# Patient Record
Sex: FEMALE | Employment: PART TIME | URBAN - METROPOLITAN AREA
[De-identification: names, ages, dates, MRNs, and addresses within clinical notes are randomized per-mention and may not be internally consistent; named-entity substitution may affect disease eponyms.]

---

## 2017-01-12 ENCOUNTER — THERAPY VISIT (OUTPATIENT)
Dept: PHYSICAL THERAPY | Facility: CLINIC | Age: 37
End: 2017-01-12
Payer: COMMERCIAL

## 2017-01-12 DIAGNOSIS — M25.461 EFFUSION OF RIGHT KNEE: ICD-10-CM

## 2017-01-12 DIAGNOSIS — Z98.890 S/P MENISCECTOMY: ICD-10-CM

## 2017-01-12 DIAGNOSIS — M25.561 RIGHT KNEE PAIN: Primary | ICD-10-CM

## 2017-01-12 PROCEDURE — 97530 THERAPEUTIC ACTIVITIES: CPT | Mod: GP | Performed by: PHYSICAL THERAPIST

## 2017-01-12 PROCEDURE — 97112 NEUROMUSCULAR REEDUCATION: CPT | Mod: GP | Performed by: PHYSICAL THERAPIST

## 2017-01-12 PROCEDURE — 97110 THERAPEUTIC EXERCISES: CPT | Mod: GP | Performed by: PHYSICAL THERAPIST

## 2017-01-12 NOTE — PROGRESS NOTES
Subjective:    HPI                    Objective:    System    Physical Exam    General     ROS    Assessment/Plan:      PROGRESS  REPORT    Progress reporting period is from 1/12/17.       SUBJECTIVE  Claribel returns to therapy for her 5th therapy session.  has been working on exercises for about 3 months and some of the kinks have been working out.  Looking for an upgrade of her exercises.    Current pain level is 1/10  .     Changes in function:  Yes (See Goal flowsheet attached for changes in current functional level)  Adverse reaction to treatment or activity: None    OBJECTIVE    ROM = WNL.      Strength:     Hip ABD = 4-/5   Hip ER = 4/5   Hip Ext = 4/5     AMB with reciprocal gait    Able to squat to 45 deg with good mechanics    ASSESSMENT/PLAN  Updated problem list and treatment plan: Diagnosis 1:  S/p Right knee scope partial meniscectomy  Pain -  self management and home program  Decreased strength - therapeutic exercise, therapeutic activities and home program  Impaired muscle performance - neuro re-education  Decreased function - therapeutic activities  STG/LTGs have been met or progress has been made towards goals:  Yes (See Goal flow sheet completed today.)  Assessment of Progress: Patient is meeting short term goals and is progressing towards long term goals.  Self Management Plans:  Patient has been instructed in a home treatment program.  I have re-evaluated this patient and find that the nature, scope, duration and intensity of the therapy is appropriate for the medical condition of the patient.  Claribel continues to require the following intervention to meet STG and LTG's:  PT    Recommendations:  This patient would benefit from continued therapy.     Frequency:  2 X a month, once daily  Duration:  for 2 months        Please refer to the daily flowsheet for treatment today, total treatment time and time spent performing 1:1 timed codes.

## 2017-01-26 ENCOUNTER — THERAPY VISIT (OUTPATIENT)
Dept: PHYSICAL THERAPY | Facility: CLINIC | Age: 37
End: 2017-01-26
Payer: COMMERCIAL

## 2017-01-26 DIAGNOSIS — M25.461 EFFUSION OF RIGHT KNEE: ICD-10-CM

## 2017-01-26 DIAGNOSIS — M25.561 RIGHT KNEE PAIN: Primary | ICD-10-CM

## 2017-01-26 DIAGNOSIS — Z98.890 S/P MENISCECTOMY: ICD-10-CM

## 2017-01-26 PROCEDURE — 97112 NEUROMUSCULAR REEDUCATION: CPT | Mod: GP | Performed by: PHYSICAL THERAPIST

## 2017-01-26 PROCEDURE — 97530 THERAPEUTIC ACTIVITIES: CPT | Mod: GP | Performed by: PHYSICAL THERAPIST

## 2017-01-26 PROCEDURE — 97110 THERAPEUTIC EXERCISES: CPT | Mod: GP | Performed by: PHYSICAL THERAPIST

## 2017-02-09 ENCOUNTER — THERAPY VISIT (OUTPATIENT)
Dept: PHYSICAL THERAPY | Facility: CLINIC | Age: 37
End: 2017-02-09
Payer: COMMERCIAL

## 2017-02-09 DIAGNOSIS — M25.561 RIGHT KNEE PAIN: Primary | ICD-10-CM

## 2017-02-09 DIAGNOSIS — Z98.890 S/P MENISCECTOMY: ICD-10-CM

## 2017-02-09 DIAGNOSIS — M25.461 EFFUSION OF RIGHT KNEE: ICD-10-CM

## 2017-02-09 PROCEDURE — 97110 THERAPEUTIC EXERCISES: CPT | Mod: GP | Performed by: PHYSICAL THERAPIST

## 2017-02-09 PROCEDURE — 97530 THERAPEUTIC ACTIVITIES: CPT | Mod: GP | Performed by: PHYSICAL THERAPIST

## 2017-02-09 PROCEDURE — 97112 NEUROMUSCULAR REEDUCATION: CPT | Mod: GP | Performed by: PHYSICAL THERAPIST

## 2017-02-23 ENCOUNTER — THERAPY VISIT (OUTPATIENT)
Dept: PHYSICAL THERAPY | Facility: CLINIC | Age: 37
End: 2017-02-23
Payer: COMMERCIAL

## 2017-02-23 DIAGNOSIS — M25.561 RIGHT KNEE PAIN: ICD-10-CM

## 2017-02-23 DIAGNOSIS — Z98.890 S/P MENISCECTOMY: ICD-10-CM

## 2017-02-23 DIAGNOSIS — M25.461 EFFUSION OF RIGHT KNEE: ICD-10-CM

## 2017-02-23 PROCEDURE — 97112 NEUROMUSCULAR REEDUCATION: CPT | Mod: GP | Performed by: PHYSICAL THERAPIST

## 2017-02-23 PROCEDURE — 97110 THERAPEUTIC EXERCISES: CPT | Mod: GP | Performed by: PHYSICAL THERAPIST

## 2017-04-13 ENCOUNTER — OFFICE VISIT (OUTPATIENT)
Dept: ORTHOPEDICS | Facility: CLINIC | Age: 37
End: 2017-04-13
Payer: COMMERCIAL

## 2017-04-13 VITALS
HEIGHT: 71 IN | SYSTOLIC BLOOD PRESSURE: 124 MMHG | BODY MASS INDEX: 18.9 KG/M2 | WEIGHT: 135 LBS | DIASTOLIC BLOOD PRESSURE: 78 MMHG

## 2017-04-13 DIAGNOSIS — Z98.890 S/P MENISCECTOMY: ICD-10-CM

## 2017-04-13 DIAGNOSIS — M25.562 ARTHRALGIA OF BOTH LOWER LEGS: Primary | ICD-10-CM

## 2017-04-13 DIAGNOSIS — M25.561 ARTHRALGIA OF BOTH LOWER LEGS: Primary | ICD-10-CM

## 2017-04-13 PROCEDURE — 99214 OFFICE O/P EST MOD 30 MIN: CPT | Performed by: FAMILY MEDICINE

## 2017-04-13 NOTE — MR AVS SNAPSHOT
"              After Visit Summary   4/13/2017    Claribel Das    MRN: [id number]           Patient Information     Date Of Birth          1980        Visit Information        Provider Department      4/13/2017 9:00 AM Bo Trinh MD [facility]        Today's Diagnoses     Arthralgia of both lower legs    -  1    S/P meniscectomy           Follow-ups after your visit        Follow-up notes from your care team     Return for MRI results/further treatment plan as necessary.      Future tests that were ordered for you today     Open Future Orders        Priority Expected Expires Ordered    MR Knee Left w/o Contrast Routine  4/13/2018 4/13/2017    MR Knee Right w/o Contrast Routine  4/13/2018 4/13/2017            Who to contact     If you have questions or need follow up information about today's clinic visit or your schedule please contact [facility] directly at [phone].  Normal or non-critical lab and imaging results will be communicated to you by [employer], letter or phone within 4 business days after the clinic has received the results. If you do not hear from us within 7 days, please contact the clinic through [employer] or phone. If you have a critical or abnormal lab result, we will notify you by phone as soon as possible.  Submit refill requests through Celeno or call your pharmacy and they will forward the refill request to us. Please allow 3 business days for your refill to be completed.          Additional Information About Your Visit        [employer] Information     Celeno lets you send messages to your doctor, view your test results, renew your prescriptions, schedule appointments and more. To sign up, go to [email or web address] . Click on \"Log in\" on the left side of the screen, which will take you to the Welcome page. Then click on \"Sign up Now\" on the right side of the page.     You " "will be asked to enter the access code listed below, as well as some personal information. Please follow the directions to create your username and password.     Your access code is: [email or web address]  Expires: 2017  2:48 PM     Your access code will  in 90 days. If you need help or a new code, please call your [place] clinic or [phone].        Care EveryWhere ID     This is your Care EveryWhere ID. This could be used by other organizations to access your [place] medical records  [id number]        Your Vitals Were     Height BMI (Body Mass Index)                5' 10.5\" (1.791 m) 19.1 kg/m2           Blood Pressure from Last 3 Encounters:   17 124/78   16 131/80   16 131/80    Weight from Last 3 Encounters:   17 135 lb (61.2 kg)   16 132 lb (59.9 kg)   16 132 lb (59.9 kg)               Primary Care Provider    None Specified       No primary provider on file.        Thank you!     Thank you for choosing [facility]  for your care. Our goal is always to provide you with excellent care. Hearing back from our patients is one way we can continue to improve our services. Please take a few minutes to complete the written survey that you may receive in the mail after your visit with us. Thank you!             Your Updated Medication List - Protect others around you: Learn how to safely use, store and throw away your medicines at www.disposemymeds.org.          This list is accurate as of: 17  9:51 AM.  Always use your most recent med list.                   Brand Name Dispense Instructions for use    NAPROXEN PO          NEW MED            "

## 2017-04-13 NOTE — PROGRESS NOTES
ROB[facility]    [facility]   Follow-up Visit s Apr 13, 2017    PCP: No primary care provider on file.      Subjective:  Claribel is a 36 year old female who is seen in follow up for evaluation of   Chief Complaint   Patient presents with     RECHECK     f/u R knee pain     Her last visit was on 3/14/16. She was referred to Dr. Tuan Lopez and had a right medial menisectomy in July 2016 and her pain hasn't fully resolved. 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 A couple days after that episode started to have posterior/lateral joint line pain and pain over the fibular head. She hasn't had any recent imaging since this episode. She does still having clicking in the medial and lateral knee that is uncomfortable. She has completed extensive physical therapy and feels stronger but the pain didn't resolve. She is following up today for further evaluation.    Also notes posteromedial LEFT knee pain and clicking, has been present on and off for several months, addressed with surgeon last fall and he opted to watch it then but she is still having trouble. Has been working on this in PHYSICAL THERAPY also    Patient's past medical, surgical, social and family histories are reviewed today.      Patient Active Problem List    Diagnosis Date Noted     Right knee pain 09/15/2016     Priority: Medium     S/P meniscectomy 09/15/2016     Priority: Medium     Effusion of right knee 09/15/2016     Priority: Medium     Low back pain 12/04/2014     Priority: Medium     Diagnosis updated by automated process. Provider to review and confirm.       Lumbar radiculopathy 12/04/2014     Priority: Medium       Family History   Problem Relation Age of Onset     CEREBROVASCULAR DISEASE Maternal Grandmother      CEREBROVASCULAR DISEASE Maternal Grandfather      CEREBROVASCULAR DISEASE Paternal Grandmother      Alcohol/Drug Brother       "OSTEOPOROSIS Maternal Grandmother      Psychotic Disorder Brother        History     Social History     Marital Status:      Spouse Name: N/A     Number of Children: N/A     Years of Education: N/A     Social History Main Topics     Smoking status: Never Smoker      Smokeless tobacco: None     Alcohol Use: None     Drug Use: None     Sexually Active: None     Past Surgical History:   Procedure Laterality Date     BACK SURGERY      lumbar microdiscectomy      SECTION  2010     HC KNEE SCOPE,SINGLE MENISECTOMY Right 1997       Review of Systems   Musculoskeletal: Positive for joint pain.   All other systems reviewed and are negative.        Physical Exam   Musculoskeletal:        Right knee: Lateral joint line tenderness noted.        Left knee: Medial joint line tenderness noted.     /78 (BP Location: Left arm, Patient Position: Chair, Cuff Size: Adult Regular)  Ht 5' 10.5\" (1.791 m)  Wt 135 lb (61.2 kg)  BMI 19.1 kg/m2  Constitutional:well-developed, well-nourished, and in no distress.   Cardiovascular: Intact distal pulses.    Neurological: alert. Gait Normal:   Gait, station, stance, and balance appear normal for age  Skin: Skin is warm and dry.   Psychiatric: Mood and affect normal.   Respiratory: unlabored, speaks in full sentences  Lymph: no LAD, no lymphangitis      Left Knee Exam   Swelling: None  Effusion: No    Tenderness   The patient is experiencing tenderness in the medial joint line.    Range of Motion   Extension: Normal  Flexion:     Abnormal    Tests   McMurrays:  Medial - Negative      Lateral - Negative  Lachman:  Anterior - Negative    Posterior - Negative  Drawer:       Anterior - Negative     Posterior - Negative  Varus:  Negative  Valgus: Negative  Pivot Shift: Negative  Patellar Apprehension: No    Comments:  Pain with compression in full flexoin    Right Knee Exam   Swelling: Mild  Effusion: Yes    Tenderness   The patient is experiencing tenderness in the lateral " joint line.    Range of Motion   Extension: Normal  Flexion:     Abnormal    Tests   McMurrays:  Medial - Negative      Lateral - Negative  Lachman:  Anterior - Negative    Posterior - Negative  Drawer:       Anterior - Negative    Posterior - Negative  Varus:  Negative  Valgus: Negative  Pivot Shift: Negative  Patellar Apprehension: Yes    Comments:  Pain with full flexion            ICD-10-CM    1. Arthralgia of both lower legs M25.561 MR Knee Left w/o Contrast    M25.562 MR Knee Right w/o Contrast   2. S/P meniscectomy Z98.890 MR Knee Right w/o Contrast     Recurrent pain s/p knee arthroscopy last year, but now posterolateral, but with clicking and swelling, concerning for new mensicus tear. MRI offered, given incomplete relief with PHYSICAL THERAPY over last several months, pt eager to proceed.    LEFT knee also troublesome for several months, also despite PHYSICAL THERAPY, with clicking but no swelling, feels similar to RIGHT knee before surgery.     MRI ordered, Claribel instructed to return at least 2 days following MRI to review results and determine treatment plan

## 2017-04-17 ENCOUNTER — TRANSFERRED RECORDS (OUTPATIENT)
Dept: HEALTH INFORMATION MANAGEMENT | Facility: CLINIC | Age: 37
End: 2017-04-17

## 2017-04-20 ENCOUNTER — OFFICE VISIT (OUTPATIENT)
Dept: ORTHOPEDICS | Facility: CLINIC | Age: 37
End: 2017-04-20
Payer: COMMERCIAL

## 2017-04-20 VITALS
HEIGHT: 71 IN | BODY MASS INDEX: 18.9 KG/M2 | SYSTOLIC BLOOD PRESSURE: 125 MMHG | DIASTOLIC BLOOD PRESSURE: 82 MMHG | WEIGHT: 135 LBS

## 2017-04-20 DIAGNOSIS — M25.461 EFFUSION OF RIGHT KNEE: Primary | ICD-10-CM

## 2017-04-20 DIAGNOSIS — M17.11 PRIMARY LOCALIZED OSTEOARTHROSIS, LOWER LEG, RIGHT: ICD-10-CM

## 2017-04-20 DIAGNOSIS — S83.242A TEAR OF MEDIAL MENISCUS OF LEFT KNEE, CURRENT, UNSPECIFIED TEAR TYPE, INITIAL ENCOUNTER: ICD-10-CM

## 2017-04-20 PROCEDURE — 20610 DRAIN/INJ JOINT/BURSA W/O US: CPT | Mod: RT | Performed by: FAMILY MEDICINE

## 2017-04-20 PROCEDURE — 99213 OFFICE O/P EST LOW 20 MIN: CPT | Mod: 25 | Performed by: FAMILY MEDICINE

## 2017-04-20 RX ORDER — METHYLPREDNISOLONE ACETATE 40 MG/ML
40 INJECTION, SUSPENSION INTRA-ARTICULAR; INTRALESIONAL; INTRAMUSCULAR; SOFT TISSUE ONCE
Qty: 1 ML | Refills: 0 | OUTPATIENT
Start: 2017-04-20 | End: 2017-04-20

## 2017-04-20 RX ORDER — LIDOCAINE HYDROCHLORIDE 10 MG/ML
4 INJECTION, SOLUTION INFILTRATION; PERINEURAL ONCE
Qty: 4 ML | Refills: 0 | OUTPATIENT
Start: 2017-04-20 | End: 2017-04-20

## 2017-04-20 NOTE — PATIENT INSTRUCTIONS
Assessment:  1. Effusion of right knee    2. Tear of medial meniscus of left knee, current, unspecified tear type, initial encounter        Plan:  Referral to an Orthopedic Surgeon  Steroid injection of the right knee was performed today in clinic  Icing for the next 1-2 days may be helpful for pain. Injection may take 10-14 days to see the full effect.  Follow up: PRN

## 2017-04-20 NOTE — MR AVS SNAPSHOT
After Visit Summary   4/20/2017    Claribel Das    MRN: [id number]           Patient Information     Date Of Birth          1980        Visit Information        Provider Department      4/20/2017 11:20 AM Bo Trinh MD [facility]        Today's Diagnoses     Effusion of right knee    -  1    Tear of medial meniscus of left knee, current, unspecified tear type, initial encounter        Primary localized osteoarthrosis, lower leg, right          Care Instructions    Assessment:  1. Effusion of right knee    2. Tear of medial meniscus of left knee, current, unspecified tear type, initial encounter        Plan:  Referral to an Orthopedic Surgeon  Steroid injection of the right knee was performed today in clinic  Icing for the next 1-2 days may be helpful for pain. Injection may take 10-14 days to see the full effect.  Follow up: PRN          Follow-ups after your visit        Additional Services     ORTHO  REFERRAL       [facility] is referring you to the Orthopedic  Services at [facility].       The  Representative will assist you in the coordination of your Orthopedic and Musculoskeletal Care as prescribed by your physician.    The  Representative will call you within 24 hours to help schedule your appointment, or you may contact the  Representative at:    [facility] ~ [phone]  [facility] ~ [phone]  [facility] ~ [phone]    Type of Referral : Surgical / Specialist- Tuan Lopez MD [facility]      Timeframe requested: Routine     Coverage of these services is subject to the terms and limitations of your health insurance plan.  Please call member services at your health plan with any benefit or coverage questions.      If X-rays, CT or MRI's have been performed, please contact the facility where  "they were done to arrange for , prior to your scheduled appointment.  Please bring this referral request to your appointment and present it to your specialist.                  Who to contact     If you have questions or need follow up information about today's clinic visit or your schedule please contact [facility] directly at [phone].  Normal or non-critical lab and imaging results will be communicated to you by MyChart, letter or phone within 4 business days after the clinic has received the results. If you do not hear from us within 7 days, please contact the clinic through [employer] or phone. If you have a critical or abnormal lab result, we will notify you by phone as soon as possible.  Submit refill requests through [employer] or call your pharmacy and they will forward the refill request to us. Please allow 3 business days for your refill to be completed.          Additional Information About Your Visit        See[employer]ra Information     [employer] lets you send messages to your doctor, view your test results, renew your prescriptions, schedule appointments and more. To sign up, go to [email or web address] . Click on \"Log in\" on the left side of the screen, which will take you to the Welcome page. Then click on \"Sign up Now\" on the right side of the page.     You will be asked to enter the access code listed below, as well as some personal information. Please follow the directions to create your username and password.     Your access code is: [email or web address]  Expires: 2017  2:48 PM     Your access code will  in 90 days. If you need help or a new code, please call your [place] clinic or [phone].        Care EveryWhere ID     This is your Care EveryWhere ID. This could be used by other organizations to access your [place] medical records  [id number]        Your Vitals Were     Height BMI (Body Mass Index)                5' 10.5\" (1.791 m) 19.1 " kg/m2           Blood Pressure from Last 3 Encounters:   04/20/17 125/82   04/13/17 124/78   03/14/16 131/80    Weight from Last 3 Encounters:   04/20/17 135 lb (61.2 kg)   04/13/17 135 lb (61.2 kg)   03/14/16 132 lb (59.9 kg)              We Performed the Following     DRAIN/INJECT LARGE JOINT/BURSA     METHYLPREDNISOLONE 40 MG INJ     ORTHO  REFERRAL          Today's Medication Changes          These changes are accurate as of: 4/20/17  1:53 PM.  If you have any questions, ask your nurse or doctor.               Start taking these medicines.        Dose/Directions    lidocaine 1 % injection   Used for:  Effusion of right knee   Started by:  Bo Trinh MD        Dose:  4 mL   4 mLs by INTRA-ARTICULAR route once for 1 dose   Quantity:  4 mL   Refills:  0       methylPREDNISolone acetate 40 MG/ML injection   Commonly known as:  DEPO-MEDROL   Used for:  Effusion of right knee   Started by:  Bo Trinh MD        Dose:  40 mg   1 mL (40 mg) by INTRA-ARTICULAR route once for 1 dose   Quantity:  1 mL   Refills:  0            Where to get your medicines      Some of these will need a paper prescription and others can be bought over the counter.  Ask your nurse if you have questions.     You don't need a prescription for these medications     lidocaine 1 % injection    methylPREDNISolone acetate 40 MG/ML injection                Primary Care Provider    None Specified       No primary provider on file.        Thank you!     Thank you for choosing [facility]  for your care. Our goal is always to provide you with excellent care. Hearing back from our patients is one way we can continue to improve our services. Please take a few minutes to complete the written survey that you may receive in the mail after your visit with us. Thank you!             Your Updated Medication List - Protect others around you: Learn how to safely use, store and throw  away your medicines at www.disposemymeds.org.          This list is accurate as of: 4/20/17  1:53 PM.  Always use your most recent med list.                   Brand Name Dispense Instructions for use    lidocaine 1 % injection     4 mL    4 mLs by INTRA-ARTICULAR route once for 1 dose       methylPREDNISolone acetate 40 MG/ML injection    DEPO-MEDROL    1 mL    1 mL (40 mg) by INTRA-ARTICULAR route once for 1 dose       NAPROXEN PO          [facility] MED

## 2017-04-20 NOTE — PROGRESS NOTES
ROBERT    [facility]   Follow-up Visit s Apr 20, 2017    PCP: No primary care provider on file.      Subjective:  Claribel is a 36 year old female who is seen in follow up for evaluation of   Chief Complaint   Patient presents with     RECHECK     B knee MRI results     Her last visit was on 4/13/2017.  Since that time, symptoms have been mostly unchanged. She did notice some swelling in the right knee which is new. Pain today is superior/lateral on the right and posterior/medial on the left. She is returning to discuss MRI results and treatment options    Patient's past medical, surgical, social and family histories are reviewed today.      History from previous visit on 4/13/2017    She was referred to Dr. Tuan Lopez and had a right medial menisectomy in July 2016 and her pain hasn't fully resolved. 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 A couple days after that episode started to have posterior/lateral joint line pain and pain over the fibular head. She hasn't had any recent imaging since this episode. She does still having clicking in the medial and lateral knee that is uncomfortable. She has completed extensive physical therapy and feels stronger but the pain didn't resolve. She is following up today for further evaluation.    Also notes posteromedial LEFT knee pain and clicking, has been present on and off for several months, addressed with surgeon last fall and he opted to watch it then but she is still having trouble. Has been working on this in PHYSICAL THERAPY also    Patient's past medical, surgical, social and family histories are reviewed today.      Patient Active Problem List    Diagnosis Date Noted     Right knee pain 09/15/2016     Priority: Medium     S/P meniscectomy 09/15/2016     Priority: Medium     Effusion of right knee 09/15/2016     Priority: Medium     Low back pain  2014     Priority: Medium     Diagnosis updated by automated process. Provider to review and confirm.       Lumbar radiculopathy 2014     Priority: Medium       Family History   Problem Relation Age of Onset     CEREBROVASCULAR DISEASE Maternal Grandmother      CEREBROVASCULAR DISEASE Maternal Grandfather      CEREBROVASCULAR DISEASE Paternal Grandmother      Alcohol/Drug Brother      OSTEOPOROSIS Maternal Grandmother      Psychotic Disorder Brother        History     Social History     Marital Status:      Spouse Name: N/A     Number of Children: N/A     Years of Education: N/A     Social History Main Topics     Smoking status: Never Smoker      Smokeless tobacco: None     Alcohol Use: None     Drug Use: None     Sexually Active: None     Past Surgical History:   Procedure Laterality Date     BACK SURGERY      lumbar microdiscectomy      SECTION  2010     HC KNEE SCOPE,SINGLE MENISECTOMY Right 1997       Review of Systems   Musculoskeletal: Positive for joint pain.   All other systems reviewed and are negative.        Physical Exam   Musculoskeletal:        Right knee: Lateral joint line tenderness noted.        Left knee: Medial joint line tenderness noted.     There were no vitals taken for this visit.  Constitutional:well-developed, well-nourished, and in no distress.   Cardiovascular: Intact distal pulses.    Neurological: alert. Gait Normal:   Gait, station, stance, and balance appear normal for age  Skin: Skin is warm and dry.   Psychiatric: Mood and affect normal.   Respiratory: unlabored, speaks in full sentences  Lymph: no LAD, no lymphangitis      Left Knee Exam   Swelling: None  Effusion: No    Tenderness   The patient is experiencing tenderness in the medial joint line.    Range of Motion   Extension: Normal  Flexion:     Abnormal    Tests   McMurrays:  Medial - Negative      Lateral - Negative  Lachman:  Anterior - Negative    Posterior - Negative  Drawer:       Anterior  - Negative     Posterior - Negative  Varus:  Negative  Valgus: Negative  Pivot Shift: Negative  Patellar Apprehension: No    Comments:  Pain with compression in full flexoin    Right Knee Exam   Swelling: Mild  Effusion: Yes    Tenderness   The patient is experiencing tenderness in the lateral joint line.    Range of Motion   Extension: Normal  Flexion:     Abnormal    Tests   McMurrays:  Medial - Negative      Lateral - Negative  Lachman:  Anterior - Negative    Posterior - Negative  Drawer:       Anterior - Negative    Posterior - Negative  Varus:  Negative  Valgus: Negative  Pivot Shift: Negative  Patellar Apprehension: Yes    Comments:  Pain with full flexion          EXAM: MRI EXAMINATION OF THE LEFT KNEE  CLINICAL INFORMATION: Left knee pain for approximately 9 months. Pain posteromedially.  TECHNICAL INFORMATION: MRI of the knee was performed without contrast. Proton density weighted images were obtained in all 3 planes. Additional T2 weighted coronal images were obtained.  INTERPRETATION: No prior for comparison.  No significant knee joint effusion. No loose body. Trace fluid in the popliteal bursa. There is mild intramuscular edema involving the lateral head of the gastrocnemius. Lesser involvement of the plantaris. Findings likely represent a low-grade strain. No hematoma.  Bones: No fracture or stress fracture is identified. No osteonecrosis.  Ligaments and tendons: The medial collateral ligament remains intact. No tear of the cruciate ligaments is identified. The iliotibial band remains intact.  Posterolateral corner: The fibular collateral ligament and remaining posterolateral corner structures are intact.  Extensor Mechanism: The patellar and quadriceps tendons are intact. The medial and lateral patellofemoral ligaments are intact.  Medial Compartment: The articular cartilage is intact, without focal defect or subchondral edema. There is a horizontal tear identified along the undersurface of the  posterior horn-body junction of the medial meniscus peripherally. The tear extends throughout the remaining posterior horn, though closer to the free edge. No displaced fragment is identified. There is partial fraying extending into the posterior root attachment.  Lateral Compartment: The articular cartilage is intact, without focal defect or subchondral edema. The lateral meniscus remains intact.  Patellofemoral articulation: The articular cartilage is intact, without focal defect or subchondral edema.  CONCLUSION:  1. Horizontal tear identified along the undersurface of the junction of the body and posterior horn of the left knee medial meniscus, with the tear extending throughout the remaining posterior horn closer to the free edge. No displacement. Partial fraying of the posterior root attachment.  2. No discrete osteochondral lesion identified in any compartment.  3. No acute ligament injury.  4. Mild intramuscular edema and likely low-grade strain of the lateral head of the gastrocnemius. No tendon tear or hematoma.  SRE    Electronically signed on 4/18/2017 10:32:00 AM by Manuela Cerna M.D.       EXAM: MRI EXAMINATION OF THE RIGHT KNEE  CLINICAL INFORMATION: Bilateral knee pain. Pain in the right knee for approximately 5 months. Partial medial meniscectomy 7/28/2016. Prior partial meniscectomy in 1997.  TECHNICAL INFORMATION: MRI of the knee was performed without contrast. Proton density weighted images were obtained in all 3 planes. Additional T2 weighted coronal images were obtained.  INTERPRETATION: Comparison is to a prior study dated 3/10/2016.  There is diffuse soft tissue edema identified about the knee. Small knee joint effusion. Mild prepatellar and infrapatellar soft tissue edema. There is intramuscular edema involving the distal biceps femoris muscle belly. Similar involvement of the lateral head of the gastrocnemius. Findings likely related to recent low grade strain. No discrete tendon tear or  hematoma. Trace fluid in the popliteal bursa.  Bones: There is very subtle marrow edema identified adjacent to the lateral femoral epicondyle, nonspecific. This may represent subtle reactive change. This is unchanged in the interval. No fracture or stress fracture is identified. No sign of osteonecrosis.  Ligaments and tendons: Likely prior, healed sprain of the medial collateral ligament proximally. No acute injury. No tear of the cruciate ligaments is identified. The iliotibial band remains intact.  Posterolateral corner: The fibular collateral ligament and remaining posterolateral corner structures are intact.  Extensor Mechanism: The patellar and quadriceps tendons are intact. The medial and lateral patellofemoral ligaments are intact.  Medial Compartment: The articular cartilage is intact, without focal defect or subchondral edema. There are postoperative changes of a prior partial medial meniscectomy. Diminutive size of the remaining body and posterior horn is noted. Portions have been resected in the interval. There is truncation along the free edge of the posterior horn, favored to be postoperative. There is concern for subtle horizontal, undersurface fraying of the posterior horn as seen on series 4 image 7-9. No displaced fragment is appreciated.  Lateral Compartment: Mild chondromalacia along the posterior articular surface of the lateral tibial plateau. No discrete tear of the lateral meniscus.  Patellofemoral articulation: Trochlear articular cartilage remains intact. No chondral defect of the patella.  CONCLUSION:  1. Postoperative changes of a prior partial right knee medial meniscectomy, with a diminutive size of the remaining body and posterior horn. Portions of meniscal tissue have been resected in the interval. Truncation along the free edge of the posterior horn is favored to be postoperative. There is concern for subtle, horizontal undersurface fraying of the posterior horn near the junction  with the remaining posterior body. No displaced fragment.  2. Mild chondromalacia along the posterior articular surface of the lateral tibial plateau.  3. Small knee joint effusion.  4. Likely strain of the distal biceps femoris muscle belly, and of the lateral head of the gastrocnemius. No tendon tear or hematoma identified.  5. Subtle marrow edema identified adjacent to the lateral femoral epicondyle, nonspecific, but possibly representing subtle reactive change. This is unchanged in the interval. No fracture or stress fracture.  SRE      Electronically signed on 4/18/2017 10:51:00 AM by Manuela Cerna M.D.       ICD-10-CM    1. Effusion of right knee M25.461 DRAIN/INJECT LARGE JOINT/BURSA     METHYLPREDNISOLONE 40 MG INJ     methylPREDNISolone acetate (DEPO-MEDROL) 40 MG/ML injection     lidocaine 1 % injection   2. Tear of medial meniscus of left knee, current, unspecified tear type, initial encounter S83.242A ORTHO  REFERRAL   3. Primary localized osteoarthrosis, lower leg, right M17.11      Now with LEFT knee meniscus tear, discussed options, she will return to dr glass for further eval and possible surgery.      RIGHT lateral knee symptoms may be related to fem condyle bony edema, lat tib chondromalacia and effusion. A short term acute pain strategy offered with cortisone steroid injection , pt eager to proceed. Longer discussion ensued regarding possible RLE longer leg, suggested trial of arch supports to unload lateral compartment .    The benefits, risks, indications for and alternatives to the procedure were discussed with the patient, including bleeding, infection, hyperglycemia, localized lipodystrophy and hypopigmentation. She elected to proceed, and informed consent was signed.    PROCEDURE:  JOINT INJECTION.         After a discussion of risks, benefits and side effects of procedure, informed patient consent was obtained, and form signed by patient and physician.       The right  knee  was prepped with Chloroprep.    INJECTION:  Using 4 cc of 1% lidocaine mixed                           with 40 mg of DepoMedrol, the right knee joint was successfully injected                           without complication using a 22G needle with the patient lying supine and the injection administered using the superolateral or lateral patellar approach.  She tolerated the procedure well with minimal discomfort. Bandaid applied. Instructed to monitor for increased warmth, redness, pain or swelling which might indicate an infection.

## 2017-04-20 NOTE — Clinical Note
Here is an update on your patient that was seen at [facility] in [place].   Please let us know if you have any questions.

## 2017-05-25 ENCOUNTER — THERAPY VISIT (OUTPATIENT)
Dept: PHYSICAL THERAPY | Facility: CLINIC | Age: 37
End: 2017-05-25
Payer: COMMERCIAL

## 2017-05-25 DIAGNOSIS — M25.561 RIGHT KNEE PAIN: ICD-10-CM

## 2017-05-25 DIAGNOSIS — M25.461 EFFUSION OF RIGHT KNEE: ICD-10-CM

## 2017-05-25 DIAGNOSIS — Z98.890 S/P MENISCECTOMY: ICD-10-CM

## 2017-05-25 PROCEDURE — 97112 NEUROMUSCULAR REEDUCATION: CPT | Mod: GP | Performed by: PHYSICAL THERAPIST

## 2017-05-25 PROCEDURE — 97110 THERAPEUTIC EXERCISES: CPT | Mod: GP | Performed by: PHYSICAL THERAPIST

## 2017-05-25 NOTE — MR AVS SNAPSHOT
"              After Visit Summary   5/25/2017    Claribel Das    MRN: [id number]           Patient Information     Date Of Birth          1980        Visit Information        Provider Department      5/25/2017 9:20 AM Lance Del Toro PT [facility]        Today's Diagnoses     Right knee pain        S/P meniscectomy        Effusion of right knee           Follow-ups after your visit        Your next 10 appointments already scheduled     Alan 15, 2017 10:00 AM CDT   SABRINA Extremity with Lance Del Toro PT   [facility] ([facility] [place])    [address] [place] [address]  [id number]  [place] [address] [phone]              Who to contact     If you have questions or need follow up information about today's clinic visit or your schedule please contact [facility] directly at [phone].  Normal or non-critical lab and imaging results will be communicated to you by [employer], letter or phone within 4 business days after the clinic has received the results. If you do not hear from us within 7 days, please contact the clinic through [employer] or phone. If you have a critical or abnormal lab result, we will notify you by phone as soon as possible.  Submit refill requests through [employer] or call your pharmacy and they will forward the refill request to us. Please allow 3 business days for your refill to be completed.          Additional Information About Your Visit        [employer] Information     [employer] lets you send messages to your doctor, view your test results, renew your prescriptions, schedule appointments and more. To sign up, go to [email or web address] . Click on \"Log in\" on the left side of the screen, which will take you to the Welcome page. Then click on \"Sign up Now\" on the right side of the page.     You will be asked to enter the access code " listed below, as well as some personal information. Please follow the directions to create your username and password.     Your access code is: [email or web address]  Expires: 2017 11:11 AM     Your access code will  in 90 days. If you need help or a new code, please call your [facility] clinic or [phone].        Care EveryWhere ID     This is your Care EveryWhere ID. This could be used by other organizations to access your [facility] medical records  [id number]         Blood Pressure from Last 3 Encounters:   17 125/82   17 124/78   16 131/80    Weight from Last 3 Encounters:   17 61.2 kg (135 lb)   17 61.2 kg (135 lb)   16 59.9 kg (132 lb)              We Performed the Following     NEUROMUSCULAR RE-EDUCATION     THERAPEUTIC EXERCISES        Primary Care Provider    None Specified       No primary provider on file.        Thank you!     Thank you for choosing [facility]  for your care. Our goal is always to provide you with excellent care. Hearing back from our patients is one way we can continue to improve our services. Please take a few minutes to complete the written survey that you may receive in the mail after your visit with us. Thank you!             Your Updated Medication List - Protect others around you: Learn how to safely use, store and throw away your medicines at www.disposemymeds.org.          This list is accurate as of: 17 11:11 AM.  Always use your most recent med list.                   Brand Name Dispense Instructions for use    NAPROXEN PO          NEW MED

## 2017-05-25 NOTE — PROGRESS NOTES
Subjective:    HPI                    Objective:    System    Physical Exam    General     ROS    Assessment/Plan:      PROGRESS  REPORT    Progress reporting period is from 5/25/17       SUBJECTIVE  Olive returns with new orders from Dr. Lopez. Since last therapy session 3 months ago she had an injection into the right knee which did not seem to help.  Then she underwent MRIs for both knees.  Right knee she reports the MRI showed: concern for new meniscal tear but not definitive (Dr. Lopez felt it was post-surgical changes), mild chondromalacia in the lateral compartment, bone edema.  Left knee showed medial meniscal tear.    Current pain level is 5/10  .     Changes in function:  Yes (See Goal flowsheet attached for changes in current functional level)  Adverse reaction to treatment or activity: None    OBJECTIVE  Right knee ROM:  7-0-140.      Left Knee ROM: 7-0-142.      Strength:     Right Hip:  ABD = 3+/5, ER = 4/5, Ext = 4/5.       Left Hip:  ABD = 3+/5, ER = 4/5, Ext = 4/5     ASSESSMENT/PLAN  Updated problem list and treatment plan: Diagnosis 1:  S/p Right knee partial medial meniscectomy, ITBS, PFPS  Pain -  self management, education and home program  Decreased strength - therapeutic exercise and therapeutic activities  Impaired muscle performance - neuro re-education  Decreased function - therapeutic activities  Diagnosis 2:  Left Knee Pain, Medial Meniscal Tear, PFPS   Pain -  self management, education and home program  Decreased strength - therapeutic exercise and therapeutic activities  Impaired muscle performance - neuro re-education  Decreased function - therapeutic activities  STG/LTGs have been met or progress has been made towards goals:  Yes (See Goal flow sheet completed today.)  Assessment of Progress: The patient's condition has potential to improve.  Self Management Plans:  Patient has been instructed in a home treatment program.  I have re-evaluated this patient and find that the  nature, scope, duration and intensity of the therapy is appropriate for the medical condition of the patient.  Claribel continues to require the following intervention to meet STG and LTG's:  PT    Recommendations:  This patient would benefit from continued therapy.     Frequency:  2-4 X a month, once daily  Duration:  for 10 visits        Please refer to the daily flowsheet for treatment today, total treatment time and time spent performing 1:1 timed codes.

## 2017-06-15 ENCOUNTER — THERAPY VISIT (OUTPATIENT)
Dept: PHYSICAL THERAPY | Facility: CLINIC | Age: 37
End: 2017-06-15
Payer: COMMERCIAL

## 2017-06-15 DIAGNOSIS — Z98.890 S/P MENISCECTOMY: ICD-10-CM

## 2017-06-15 DIAGNOSIS — M25.561 RIGHT KNEE PAIN: ICD-10-CM

## 2017-06-15 DIAGNOSIS — M25.461 EFFUSION OF RIGHT KNEE: ICD-10-CM

## 2017-06-15 PROCEDURE — 97112 NEUROMUSCULAR REEDUCATION: CPT | Mod: GP | Performed by: PHYSICAL THERAPIST

## 2017-06-15 PROCEDURE — 97110 THERAPEUTIC EXERCISES: CPT | Mod: GP | Performed by: PHYSICAL THERAPIST

## 2017-06-29 ENCOUNTER — THERAPY VISIT (OUTPATIENT)
Dept: PHYSICAL THERAPY | Facility: CLINIC | Age: 37
End: 2017-06-29
Payer: COMMERCIAL

## 2017-06-29 DIAGNOSIS — M25.461 EFFUSION OF RIGHT KNEE: ICD-10-CM

## 2017-06-29 DIAGNOSIS — Z98.890 S/P MENISCECTOMY: ICD-10-CM

## 2017-06-29 DIAGNOSIS — M25.561 RIGHT KNEE PAIN: ICD-10-CM

## 2017-06-29 PROCEDURE — 97110 THERAPEUTIC EXERCISES: CPT | Mod: GP | Performed by: PHYSICAL THERAPIST

## 2017-06-29 PROCEDURE — 97112 NEUROMUSCULAR REEDUCATION: CPT | Mod: GP | Performed by: PHYSICAL THERAPIST

## 2017-10-26 ENCOUNTER — THERAPY VISIT (OUTPATIENT)
Dept: CHIROPRACTIC MEDICINE | Facility: CLINIC | Age: 37
End: 2017-10-26
Payer: COMMERCIAL

## 2017-10-26 DIAGNOSIS — G89.29 CHRONIC PAIN OF RIGHT KNEE: ICD-10-CM

## 2017-10-26 DIAGNOSIS — M25.561 CHRONIC PAIN OF RIGHT KNEE: ICD-10-CM

## 2017-10-26 DIAGNOSIS — G89.29 CHRONIC LOW BACK PAIN WITHOUT SCIATICA, UNSPECIFIED BACK PAIN LATERALITY: ICD-10-CM

## 2017-10-26 DIAGNOSIS — M54.50 CHRONIC LOW BACK PAIN WITHOUT SCIATICA, UNSPECIFIED BACK PAIN LATERALITY: ICD-10-CM

## 2017-10-26 DIAGNOSIS — M79.10 MYALGIA: ICD-10-CM

## 2017-10-26 DIAGNOSIS — M99.05 SOMATIC DYSFUNCTION OF PELVIS REGION: Primary | ICD-10-CM

## 2017-10-26 PROCEDURE — 99203 OFFICE O/P NEW LOW 30 MIN: CPT | Performed by: CHIROPRACTOR

## 2017-10-26 NOTE — PROGRESS NOTES
[place] [facility]  Oct 26, 2017        My name is Raquel Das and I'm a patient of Dr. Tuan Lopez with [facility]. I've also worked a lot with Goran Del Toro on physical therapy. Dr. Lopez gave me your name and thought it would be a good idea to meet with you regarding manual therapy, etc. I'm 1 year post-op a right partial medial meniscectomy (my second one) and I also have a left medial meniscus tear. I still have continued right knee pain and inflammation that occurs very easily.   I had back surgery for a herniated L5S1 disc about 5 years ago. I've been told in the past my hips are misaligned and that I have a functional leg length discrepancy (and it's very obvious to me when I look in the mirror). The physical therapy has helped me gain strength and stability and I plan to continue with PT, but my back, hips, and hamstrings are so tight that there's a lot of strain on my joints. I cannot even sit on the floor with my legs stretched out in front of me. I'm 37 years old and have been running since I was 18, but it's been on and off the last 5 years due to injuries. In fact everyday life/ADLs have become difficult due to ongoing discomfort. I'm trying to get back into things and be normal again, but the constant strain and discomfort has made it very difficult. I started cross training with swimming and biking and was even able to complete a sprint triathlon last weekend, which sounds crazy considering it's a strain just for me to pick something up off the floor, but some days are better than others. Yesterday I tried to go for a jog and had to stop due to knee pain and overall discomfort. I've tried stretching, which helps, but I'm so tight it doesn't seem like I can make any significant headway. I know this is very long-winded, but basically your name came up when I told Dr. Lopez I thought my hip misalignment and muscle tightness was at the root of my problems. Both my  post-op right knee MRI and my post-op lumbar MRIs have been stable.    Thanks for reading.      Subjective:  Claribel Das   37 year old   female    CC: chronic pain right knee, B hips, lower back  Medications reviewed: Denies   Visit: 1/6  Goal: sand for 30 minutes without pain, sit for 30 minutes without hip pain  Location: general lower back, R medial knee, B hips   JORJE: Surgery for right knee July 2016, previous back surgery, torn meniscus in left knee  Pain: varies but always 1/10, worse with activity   Previous History: Surgery L5/S1, Knee surgery scope on R  Progression: not changing over last couple years   Quality: always feels off, feels like right foot is rotated out, B hamstrings and hips are very tight, medial knee soreness on right  Radiation: Denies   Pain is worse with: running, sitting for long periods  Pain is better with: stretching  Timing: frequent pain  Under care: has seen multiple providers from MD's to PT's  Imaging: in past  Social: alert, oriented, and active. Works as RN.     Objective:  Inspection:  No SDD  Scars on right knee look normal     Palpation:  No specific pain  Palpable soreness over R medial knee, General hips, lower back  Myofascitis 2/4 noted over LPS, B hip ER's, R adductors     ROM:  Lumbar flexion   90/90, tightness over lower back and B hamstrings   Lumbar extension  30/30, lower back discomfort   Right Hip IR  24/45  Left Hip IR  39/45  Right Hip ER  54/60  Left  hip ER  48/60  Knee flexion full with tightness and pain over anterior knee on right  Knee abduction limited 7 degrees on right with discomfort over medial knee  Knee extension full    MMT:  Left glute med 4/5 with no pain  Right glute med 4/5 with no pain  Left TFL 4/5 with no pain  Right TFL 4/5 with no pain  Left piriformis 5/5 with no pain  Right piriformis 5/5 with no pain    MET:  Right short leg    Squat:  Shift to right  Foot flare to right  Arm drop on right    Lunge:  Right knee genu  valgum  Right knee cross over     NAL:  Restricted SI on right side    Neuro:  Able to toe walk and heel walk  L4-S1 light touch WNL  MMT L4, L5, S1 5/5     Ortho:  SLR (tightness B hamstring), kemps (mild lower back discomfort)    Assessment:  NAL with associated myofascitis and weakness  Lumbago: post surgery  Hip Pain B  Knee pain R: Post surgery     Plan:   Decrease pain 50%    Restore symmetric hip IR   Restore symmetric hip ER   Strengthen hip stabilizers to 5/5 B   Restore pelvic leveling   Restore segmental motion   Functional goals in history    Patient was given detailed history, review of symptoms, examination, functional examination, and report of findings. After this patient was treated with chiropractic adjustments, manual therapy, and therapeutic exercise. Patient tolerated treatment well. Patients treatment plan with be 2 times per week for 2 weeks followed by 1 time per week for 2 weeks. Following treatment plan a follow up exam will be done to make sure patient is improving. Treatment frequency will degrease as patients subjective complaints improve as well as objective findings. Prognosis for care is good based on fact that patient is active and is willing to take active approach in care.     Patient tolerated treatment well today  Treatment Time: 45 minutes  73676 manipulation 1-2 segments: MET, Hip LAD, Manual adjustment   09312 manipulation: Manual extremity  49988 Manual therapy: (ART, Graston, Strain Counter Strain, Fascial Manipulation, Cupping) performed over area of R adductors, R hamstring, R hip ER's, L hip IR's, B psoas   84454 therapeutic exercise (20 minutes): review of some current PT exercises   Strapping: hip IR on R, hip ER left  Taping:  Next visit: 1 week    History of lower back pain, hip pain, and knee pain. Level 3 exam done today  Magnus Austin DC, MEd, ATC

## 2017-10-26 NOTE — MR AVS SNAPSHOT
After Visit Summary   10/26/2017    Claribel Das    MRN: [id number]           Patient Information     Date Of Birth          1980        Visit Information        Provider Department      10/26/2017 9:30 AM Magnus Austin DC [facility] [place] [facility]        Today's Diagnoses     Somatic dysfunction of pelvis region    -  1    Chronic pain of right knee        Chronic low back pain without sciatica, unspecified back pain laterality        Myalgia           Follow-ups after your visit        Your next 10 appointments already scheduled     Nov 02, 2017  9:30 AM CDT   [facility] Chiropractor with Magnus Austin DC   [facility] ([facility] [place])    [address] [place] [address]  [address]  [place] [address]   [phone]            Nov 06, 2017  4:45 PM CST   [facility] Chiropractor with Magnus Austin DC   [facility] ([facility] [place])    [address] [place] [address]  [address]  [place] [address]   [phone]            Nov 13, 2017  3:15 PM CST   [facility] Chiropractor with Magnus Austin DC   [facility] ([facility] [place])    [address] [place] [address]  [address]  [place] [address]   [phone]              Who to contact     If you have questions or need follow up information about today's clinic visit or your schedule please contact [facility] [place] directly at [phone].  Normal or non-critical lab and imaging results will be communicated to you by MyChart, letter or phone within 4 business days after the clinic has received the results. If you do not hear from us within 7 days, please contact the clinic through MyChart or phone. If you have a critical or abnormal lab result, we will notify you by phone as soon as possible.  Submit refill requests through [employer] or call your  "pharmacy and they will forward the refill request to us. Please allow 3 business days for your refill to be completed.          Additional Information About Your Visit        MyChart Information     [employer] lets you send messages to your doctor, view your test results, renew your prescriptions, schedule appointments and more. To sign up, go to [email or web address] . Click on \"Log in\" on the left side of the screen, which will take you to the Welcome page. Then click on \"Sign up Now\" on the right side of the page.     You will be asked to enter the access code listed below, as well as some personal information. Please follow the directions to create your username and password.     Your access code is: [email or web address]  Expires: 2018  4:26 PM     Your access code will  in 90 days. If you need help or a new code, please call your [place] clinic or [phone].        Care EveryWhere ID     This is your Care EveryWhere ID. This could be used by other organizations to access your [place] medical records  [id number]         Blood Pressure from Last 3 Encounters:   17 125/82   17 124/78   16 131/80    Weight from Last 3 Encounters:   17 61.2 kg (135 lb)   17 61.2 kg (135 lb)   16 59.9 kg (132 lb)              We Performed the Following     OFFICE/OUTPT VISIT,KEHINDE YANEZ III        Primary Care Provider    Physician No Ref-Primary       NO REF-PRIMARY PHYSICIAN        Equal Access to Services     [facility] : Hadii aad ku hadasho Soomaali, waaxda luqadaha, qaybta kaalmada adeegyada, waxay andi johnson . So [facility] [phone].    ATENCIÓN: Si habla lyndseyañol, tiene a garcia disposición servicios gratuitos de asistencia lingüística. Llame al [phone].    We comply with applicable federal civil rights laws and [place] laws. We do not discriminate on the basis of race, color, national origin, age, disability, sex, sexual orientation, or gender " identity.            Thank you!     Thank you for choosing [facility]  for your care. Our goal is always to provide you with excellent care. Hearing back from our patients is one way we can continue to improve our services. Please take a few minutes to complete the written survey that you may receive in the mail after your visit with us. Thank you!             Your Updated Medication List - Protect others around you: Learn how to safely use, store and throw away your medicines at www.disposemymeds.org.          This list is accurate as of: 10/26/17 11:59 PM.  Always use your most recent med list.                   Brand Name Dispense Instructions for use Diagnosis    NAPROXEN PO           NEW MED

## 2017-10-30 PROBLEM — M99.05 SOMATIC DYSFUNCTION OF PELVIS REGION: Status: ACTIVE | Noted: 2017-10-30

## 2017-10-30 PROBLEM — M79.10 MYALGIA: Status: ACTIVE | Noted: 2017-10-30

## 2017-11-02 ENCOUNTER — THERAPY VISIT (OUTPATIENT)
Dept: CHIROPRACTIC MEDICINE | Facility: CLINIC | Age: 37
End: 2017-11-02
Payer: COMMERCIAL

## 2017-11-02 DIAGNOSIS — M54.50 CHRONIC LOW BACK PAIN WITHOUT SCIATICA, UNSPECIFIED BACK PAIN LATERALITY: ICD-10-CM

## 2017-11-02 DIAGNOSIS — G89.29 CHRONIC PAIN OF RIGHT KNEE: ICD-10-CM

## 2017-11-02 DIAGNOSIS — M25.561 CHRONIC PAIN OF RIGHT KNEE: ICD-10-CM

## 2017-11-02 DIAGNOSIS — G89.29 CHRONIC LOW BACK PAIN WITHOUT SCIATICA, UNSPECIFIED BACK PAIN LATERALITY: ICD-10-CM

## 2017-11-02 DIAGNOSIS — M79.10 MYALGIA: ICD-10-CM

## 2017-11-02 DIAGNOSIS — M99.05 SOMATIC DYSFUNCTION OF PELVIS REGION: Primary | ICD-10-CM

## 2017-11-02 PROCEDURE — 97110 THERAPEUTIC EXERCISES: CPT | Performed by: CHIROPRACTOR

## 2017-11-02 PROCEDURE — 98940 CHIROPRACT MANJ 1-2 REGIONS: CPT | Mod: AT | Performed by: CHIROPRACTOR

## 2017-11-02 NOTE — PROGRESS NOTES
[place] [facility]  Oct 26, 2017        My name is Raquel Das and I'm a patient of Dr. Tuan Lopez with [facility]. I've also worked a lot with Goran Del Toro on physical therapy. Dr. Lopez gave me your name and thought it would be a good idea to meet with you regarding manual therapy, etc. I'm 1 year post-op a right partial medial meniscectomy (my second one) and I also have a left medial meniscus tear. I still have continued right knee pain and inflammation that occurs very easily.   I had back surgery for a herniated L5S1 disc about 5 years ago. I've been told in the past my hips are misaligned and that I have a functional leg length discrepancy (and it's very obvious to me when I look in the mirror). The physical therapy has helped me gain strength and stability and I plan to continue with PT, but my back, hips, and hamstrings are so tight that there's a lot of strain on my joints. I cannot even sit on the floor with my legs stretched out in front of me. I'm 37 years old and have been running since I was 18, but it's been on and off the last 5 years due to injuries. In fact everyday life/ADLs have become difficult due to ongoing discomfort. I'm trying to get back into things and be normal again, but the constant strain and discomfort has made it very difficult. I started cross training with swimming and biking and was even able to complete a sprint triathlon last weekend, which sounds crazy considering it's a strain just for me to pick something up off the floor, but some days are better than others. Yesterday I tried to go for a jog and had to stop due to knee pain and overall discomfort. I've tried stretching, which helps, but I'm so tight it doesn't seem like I can make any significant headway. I know this is very long-winded, but basically your name came up when I told Dr. Lopez I thought my hip misalignment and muscle tightness was at the root of my problems. Both my  post-op right knee MRI and my post-op lumbar MRIs have been stable.    Thanks for reading.      Subjective:  Claribel Das   37 year old   female    CC: chronic pain right knee, B hips, lower back  Medications reviewed: Denies   Visit: 1/6  Goal: sand for 30 minutes without pain, sit for 30 minutes without hip pain  Location: general lower back, R medial knee, B hips   JORJE: Surgery for right knee July 2016, previous back surgery, torn meniscus in left knee  Pain: varies but always 1/10, worse with activity   Previous History: Surgery L5/S1, Knee surgery scope on R  Progression: not changing over last couple years   Quality: always feels off, feels like right foot is rotated out, B hamstrings and hips are very tight, medial knee soreness on right  Radiation: Denies   Pain is worse with: running, sitting for long periods  Pain is better with: stretching  Timing: frequent pain  Under care: has seen multiple providers from MD's to PT's  Imaging: in past  Social: alert, oriented, and active. Works as RN.     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 Tolerated exam well and was not sore. Ready to start treatment. Notes hips and lower back are sore.     Objective:  Inspection:  No SDD  Scars on right knee look normal     Palpation:  No specific pain  Palpable soreness over R medial knee, General hips, lower back  Myofascitis 2/4 noted over LPS, B hip ER's, R adductors     ROM:  Lumbar flexion   90/90, tightness over lower back and B hamstrings   Lumbar extension  30/30, lower back discomfort   Right Hip IR  24/45  Left Hip IR  39/45  Right Hip ER  54/60  Left  hip ER  48/60  Knee flexion full with tightness and pain over anterior knee on right  Knee abduction limited 7 degrees on right with discomfort over medial knee  Knee extension full    MMT:  Left glute med 4/5 with no pain  Right glute med 4/5 with no pain  Left TFL 4/5 with no pain  Right TFL 4/5 with no pain  Left piriformis 5/5 with no pain  Right piriformis 5/5 with no  pain    MET:  Right short leg    Squat:  Shift to right  Foot flare to right  Arm drop on right    Lunge:  Right knee genu valgum  Right knee cross over     NAL:  Restricted SI on right side    Ortho:  SLR (tightness B hamstring), kemps (mild lower back discomfort)    Assessment:  NAL with associated myofascitis and weakness  Lumbago: post surgery  Hip Pain B  Knee pain R: Post surgery     Plan:  Patient tolerated treatment well today  Treatment Time: 45 minutes  41894 manipulation 1-2 segments: MET, Hip LAD, Manual adjustment   57154 manipulation: Manual extremity  35120 Manual therapy: (ART, Graston, Strain Counter Strain, Fascial Manipulation, Cupping) performed over area of R adductors, R hamstring, R hip ER's, L hip IR's, B psoas   29021 therapeutic exercise (20 minutes): review of some current PT exercises, bottom leg clam rotation X30  Strapping: hip IR on R, hip ER left  Taping:  Next visit: 1 week    Magnus Austin DC, MEd, ATC

## 2017-11-02 NOTE — MR AVS SNAPSHOT
After Visit Summary   11/2/2017    Claribel Das    MRN: [id number]           Patient Information     Date Of Birth          1980        Visit Information        Provider Department      11/2/2017 9:30 AM Magnus Austin DC [facility] [place] [facility]        Today's Diagnoses     Somatic dysfunction of pelvis region    -  1    Myalgia        Chronic pain of right knee        Chronic low back pain without sciatica, unspecified back pain laterality           Follow-ups after your visit        Your next 10 appointments already scheduled     Nov 06, 2017  4:45 PM CST   SABRINA Chiropractor with aMgnus Austin DC   [facility] ([facility] [place])    [address] [place] [address]  [address]  [place] [address]   [phone]            Nov 13, 2017  3:15 PM CST   [facility] Chiropractor with Magnus Austin DC   [facility] ([facility] [place])    [address] [place] [address]  [address]  [place] [address]   [phone]              Who to contact     If you have questions or need follow up information about today's clinic visit or your schedule please contact [facility] CH[facility]OPRACTOR directly at [phone].  Normal or non-critical lab and imaging results will be communicated to you by [employer], letter or phone within 4 business days after the clinic has received the results. If you do not hear from us within 7 days, please contact the clinic through [employer] or phone. If you have a critical or abnormal lab result, we will notify you by phone as soon as possible.  Submit refill requests through [employer] or call your pharmacy and they will forward the refill request to us. Please allow 3 business days for your refill to be completed.          Additional Information About Your Visit        [employer] Information     [employer] lets you send messages to your  "doctor, view your test results, renew your prescriptions, schedule appointments and more. To sign up, go to [email or web address] . Click on \"Log in\" on the left side of the screen, which will take you to the Welcome page. Then click on \"Sign up Now\" on the right side of the page.     You will be asked to enter the access code listed below, as well as some personal information. Please follow the directions to create your username and password.     Your access code is: [email or web address]  Expires: 2018  4:26 PM     Your access code will  in 90 days. If you need help or a new code, please call your [place] clinic or [phone].        Care EveryWhere ID     This is your Care EveryWhere ID. This could be used by other organizations to access your [place] medical records  [id number]         Blood Pressure from Last 3 Encounters:   17 125/82   17 124/78   16 131/80    Weight from Last 3 Encounters:   17 61.2 kg (135 lb)   17 61.2 kg (135 lb)   16 59.9 kg (132 lb)              We Performed the Following     CHIROPRAC MANIP,SPINAL,1-2 REGIONS     THERAPEUTIC EXERCISES        Primary Care Provider    Physician No Ref-Primary       NO REF-PRIMARY PHYSICIAN        Equal Access to Services     [place] : Hadii aad ku hadasho Soomaali, waaxda luqadaha, qaybta kaalmada adeegyada, anamika johnson . So [facility] [phone].    ATENCIÓN: Si habla español, tiene a garcia disposición servicios gratuitos de asistencia lingüística. Llame al [phone].    We comply with applicable federal civil rights laws and [place] laws. We do not discriminate on the basis of race, color, national origin, age, disability, sex, sexual orientation, or gender identity.            Thank you!     Thank you for choosing [place] [facility]  for your care. Our goal is always to provide you with excellent care. Hearing back from our patients is " one way we can continue to improve our services. Please take a few minutes to complete the written survey that you may receive in the mail after your visit with us. Thank you!             Your Updated Medication List - Protect others around you: Learn how to safely use, store and throw away your medicines at www.disposemymeds.org.          This list is accurate as of: 11/2/17  5:39 PM.  Always use your most recent med list.                   Brand Name Dispense Instructions for use Diagnosis    NAPROXEN PO           NEW MED

## 2017-11-06 ENCOUNTER — THERAPY VISIT (OUTPATIENT)
Dept: CHIROPRACTIC MEDICINE | Facility: CLINIC | Age: 37
End: 2017-11-06
Payer: COMMERCIAL

## 2017-11-06 DIAGNOSIS — G89.29 CHRONIC LOW BACK PAIN WITHOUT SCIATICA, UNSPECIFIED BACK PAIN LATERALITY: ICD-10-CM

## 2017-11-06 DIAGNOSIS — M25.561 CHRONIC PAIN OF RIGHT KNEE: ICD-10-CM

## 2017-11-06 DIAGNOSIS — M54.50 CHRONIC LOW BACK PAIN WITHOUT SCIATICA, UNSPECIFIED BACK PAIN LATERALITY: ICD-10-CM

## 2017-11-06 DIAGNOSIS — M99.05 SOMATIC DYSFUNCTION OF PELVIS REGION: Primary | ICD-10-CM

## 2017-11-06 DIAGNOSIS — G89.29 CHRONIC PAIN OF RIGHT KNEE: ICD-10-CM

## 2017-11-06 DIAGNOSIS — M79.10 MYALGIA: ICD-10-CM

## 2017-11-06 PROCEDURE — 97110 THERAPEUTIC EXERCISES: CPT | Performed by: CHIROPRACTOR

## 2017-11-06 PROCEDURE — 98940 CHIROPRACT MANJ 1-2 REGIONS: CPT | Mod: AT | Performed by: CHIROPRACTOR

## 2017-11-06 NOTE — MR AVS SNAPSHOT
"              After Visit Summary   11/6/2017    Claribel Das    MRN: [id number]           Patient Information     Date Of Birth          1980        Visit Information        Provider Department      11/6/2017 4:45 PM Magnus Austin DC [facility] [place] Chirop[facility]        Today's Diagnoses     Somatic dysfunction of pelvis region    -  1    Myalgia        Chronic pain of right knee        Chronic low back pain without sciatica, unspecified back pain laterality           Follow-ups after your visit        Your next 10 appointments already scheduled     Nov 13, 2017  3:15 PM [facility] Chiropractor with Magnus Austin DC   [facility] [place] [facility]rop[facility] ([facility] [place])    [address] [place] [address]  [address]  [place] [address] [phone]              Who to contact     If you have questions or need follow up information about today's clinic visit or your schedule please contact [facility] [place] [facility] directly at [phone].  Normal or non-critical lab and imaging results will be communicated to you by EnerMotionhart, letter or phone within 4 business days after the clinic has received the results. If you do not hear from us within 7 days, please contact the clinic through Dragon Portst or phone. If you have a critical or abnormal lab result, we will notify you by phone as soon as possible.  Submit refill requests through Talento al Aula or call your pharmacy and they will forward the refill request to us. Please allow 3 business days for your refill to be completed.          Additional Information About Your Visit        EnerMotionharIMGuest Information     Talento al Aula lets you send messages to your doctor, view your test results, renew your prescriptions, schedule appointments and more. To sign up, go to [email or web address] . Click on \"Log in\" on the left side of the screen, which will take you to the Welcome page. Then click on " "\"Sign up Now\" on the right side of the page.     You will be asked to enter the access code listed below, as well as some personal information. Please follow the directions to create your username and password.     Your access code is: [email or web address]  Expires: 2018  3:26 PM     Your access code will  in 90 days. If you need help or a new code, please call your [place] clinic or [phone].        Care EveryWhere ID     This is your Care EveryWhere ID. This could be used by other organizations to access your [place] medical records  [id number]         Blood Pressure from Last 3 Encounters:   17 125/82   17 124/78   16 131/80    Weight from Last 3 Encounters:   17 61.2 kg (135 lb)   17 61.2 kg (135 lb)   16 59.9 kg (132 lb)              We Performed the Following     CHIROPRAC MANIP,SPINAL,1-2 REGIONS     THERAPEUTIC EXERCISES        Primary Care Provider    Physician No Ref-Primary       NO REF-PRIMARY PHYSICIAN        Equal Access to Services     [facility]: Hadii vanita ku hadsuzannao Soaislinn, waaxda luqadaha, qaybta kaalmabharati mayes, anamika johnson . So [facility] [phone].    ATENCIÓN: Si habla español, tiene a garcia disposición servicios gratuitos de asistencia lingüística. Cathleen al [phone].    We comply with applicable federal civil rights laws and [place] laws. We do not discriminate on the basis of race, color, national origin, age, disability, sex, sexual orientation, or gender identity.            Thank you!     Thank you for choosing [facility]  [place] [facility]  for your care. Our goal is always to provide you with excellent care. Hearing back from our patients is one way we can continue to improve our services. Please take a few minutes to complete the written survey that you may receive in the mail after your visit with us. Thank you!             Your Updated Medication List - Protect others " around you: Learn how to safely use, store and throw away your medicines at www.disposemymeds.org.          This list is accurate as of: 11/6/17  6:16 PM.  Always use your most recent med list.                   Brand Name Dispense Instructions for use Diagnosis    NAPROXEN PO           NEW MED

## 2017-11-07 NOTE — PROGRESS NOTES
[place] [facility]  Oct 26, 2017        My name is Raquel Das and I'm a patient of Dr. Tuan Lopez with [facility]. I've also worked a lot with Goran Del Toro on physical therapy. Dr. Lopez gave me your name and thought it would be a good idea to meet with you regarding manual therapy, etc. I'm 1 year post-op a right partial medial meniscectomy (my second one) and I also have a left medial meniscus tear. I still have continued right knee pain and inflammation that occurs very easily.   I had back surgery for a herniated L5S1 disc about 5 years ago. I've been told in the past my hips are misaligned and that I have a functional leg length discrepancy (and it's very obvious to me when I look in the mirror). The physical therapy has helped me gain strength and stability and I plan to continue with PT, but my back, hips, and hamstrings are so tight that there's a lot of strain on my joints. I cannot even sit on the floor with my legs stretched out in front of me. I'm 37 years old and have been running since I was 18, but it's been on and off the last 5 years due to injuries. In fact everyday life/ADLs have become difficult due to ongoing discomfort. I'm trying to get back into things and be normal again, but the constant strain and discomfort has made it very difficult. I started cross training with swimming and biking and was even able to complete a sprint triathlon last weekend, which sounds crazy considering it's a strain just for me to pick something up off the floor, but some days are better than others. Yesterday I tried to go for a jog and had to stop due to knee pain and overall discomfort. I've tried stretching, which helps, but I'm so tight it doesn't seem like I can make any significant headway. I know this is very long-winded, but basically your name came up when I told Dr. Lopez I thought my hip misalignment and muscle tightness was at the root of my problems. Both my  post-op right knee MRI and my post-op lumbar MRIs have been stable.    Thanks for reading.      Subjective:  Claribel Das   37 year old   female    CC: chronic pain right knee, B hips, lower back  Medications reviewed: Denies   Visit: 2/6  Goal: sand for 30 minutes without pain, sit for 30 minutes without hip pain  Location: general lower back, R medial knee, B hips   JORJE: Surgery for right knee July 2016, previous back surgery, torn meniscus in left knee  Pain: varies but always 1/10, worse with activity   Previous History: Surgery L5/S1, Knee surgery scope on R  Progression: not changing over last couple years   Quality: always feels off, feels like right foot is rotated out, B hamstrings and hips are very tight, medial knee soreness on right  Radiation: Denies   Pain is worse with: running, sitting for long periods  Pain is better with: stretching  Timing: frequent pain  Under care: has seen multiple providers from MD's to PT's  Imaging: in past  Social: alert, oriented, and active. Works as RN.     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 Was not sore. Has had occasional knee pain. Notes back is stiff. Denies any new issues. She asked about working with both Goran (PT) rehab and my and I thought she should continue both. She agreed to this. She said she has had deep manual work more aggressive than what I do so can handle it.     Objective:  Inspection:  No SDD  Scars on right knee look normal     Palpation:  No specific pain  Palpable soreness over R medial knee, General hips, lower back  Myofascitis 2/4 noted over LPS, B hip ER's, R adductors     ROM:  Lumbar flexion   90/90, tightness over lower back and B hamstrings   Lumbar extension  30/30, lower back discomfort   Right Hip IR  24/45  Left Hip IR  39/45  Right Hip ER  54/60  Left  hip ER  48/60  Knee flexion full with tightness and pain over anterior knee on right  Knee abduction limited 7 degrees on right with discomfort over medial knee  Knee extension  full    MMT:  Left glute med 4/5 with no pain  Right glute med 4/5 with no pain  Left TFL 4/5 with no pain  Right TFL 4/5 with no pain  Left piriformis 5/5 with no pain  Right piriformis 5/5 with no pain    MET:  Right short leg    Squat:  Shift to right  Foot flare to right  Arm drop on right    Lunge:  Right knee genu valgum  Right knee cross over     NAL:  Restricted SI on right side    Ortho:  SLR (tightness B hamstring), kemps (mild lower back discomfort)    Assessment:  NAL with associated myofascitis and weakness  Lumbago: post surgery  Hip Pain B  Knee pain R: Post surgery     Plan:  Patient tolerated treatment well today  Treatment Time: 45 minutes  04373 manipulation 1-2 segments: MET, Hip LAD, (NO manual adjustments)  85259 Manual therapy: (ART, Graston, Strain Counter Strain, Fascial Manipulation, Cupping) performed over area of R adductors, R hamstring, R hip ER's, L hip IR's, B psoas   51751 therapeutic exercise (20 minutes): review of some current PT exercises, bottom leg clam rotation X30  Strapping: hip IR on R, hip ER left  Taping:  Next visit: 1 week    Magnus Austin DC, MEd, ATC

## 2017-11-24 ENCOUNTER — THERAPY VISIT (OUTPATIENT)
Dept: CHIROPRACTIC MEDICINE | Facility: CLINIC | Age: 37
End: 2017-11-24
Payer: COMMERCIAL

## 2017-11-24 DIAGNOSIS — M99.05 SOMATIC DYSFUNCTION OF PELVIS REGION: Primary | ICD-10-CM

## 2017-11-24 DIAGNOSIS — M25.561 CHRONIC PAIN OF RIGHT KNEE: ICD-10-CM

## 2017-11-24 DIAGNOSIS — M54.50 CHRONIC LOW BACK PAIN WITHOUT SCIATICA, UNSPECIFIED BACK PAIN LATERALITY: ICD-10-CM

## 2017-11-24 DIAGNOSIS — G89.29 CHRONIC PAIN OF RIGHT KNEE: ICD-10-CM

## 2017-11-24 DIAGNOSIS — M79.10 MYALGIA: ICD-10-CM

## 2017-11-24 DIAGNOSIS — G89.29 CHRONIC LOW BACK PAIN WITHOUT SCIATICA, UNSPECIFIED BACK PAIN LATERALITY: ICD-10-CM

## 2017-11-24 PROCEDURE — 98940 CHIROPRACT MANJ 1-2 REGIONS: CPT | Mod: AT | Performed by: CHIROPRACTOR

## 2017-11-24 PROCEDURE — 97110 THERAPEUTIC EXERCISES: CPT | Performed by: CHIROPRACTOR

## 2017-11-24 NOTE — MR AVS SNAPSHOT
After Visit Summary   11/24/2017    Claribel Das    MRN: [id number]           Patient Information     Date Of Birth          1980        Visit Information        Provider Department      11/24/2017 4:45 PM Magnus Austin DC [facility] [place] Chiropractor        Today's Diagnoses     Somatic dysfunction of pelvis region    -  1    Myalgia        Chronic pain of right knee        Chronic low back pain without sciatica, unspecified back pain laterality           Follow-ups after your visit        Your next 10 appointments already scheduled     Dec 05, 2017  4:00 PM CST   [place] Chiropractor with Magnus Austin DC   [facility] [place] [facility] ([facility] [place])    [address] [place] [address]  [address]  [place] [address]   [phone]            Dec 12, 2017  4:45 PM CST   [facility] Chiropractor with Magnus Austin DC   [facility] [place] [facility] ([facility] [place])    [address] [place] [address]  [address]  [place] [address]   [phone]              Who to contact     If you have questions or need follow up information about today's clinic visit or your schedule please contact [facility] directly at [phone].  Normal or non-critical lab and imaging results will be communicated to you by Autogeneration Marketinghart, letter or phone within 4 business days after the clinic has received the results. If you do not hear from us within 7 days, please contact the clinic through Autogeneration Marketinghart or phone. If you have a critical or abnormal lab result, we will notify you by phone as soon as possible.  Submit refill requests through Directly or call your pharmacy and they will forward the refill request to us. Please allow 3 business days for your refill to be completed.          Additional Information About Your Visit        Autogeneration Marketinghart Information     Directly lets you send messages to your  "doctor, view your test results, renew your prescriptions, schedule appointments and more. To sign up, go to [email or web address] . Click on \"Log in\" on the left side of the screen, which will take you to the Welcome page. Then click on \"Sign up Now\" on the right side of the page.     You will be asked to enter the access code listed below, as well as some personal information. Please follow the directions to create your username and password.     Your access code is: [email or web address]  Expires: 2018  3:26 PM     Your access code will  in 90 days. If you need help or a new code, please call your [place] clinic or [phone].        Care EveryWhere ID     This is your Care EveryWhere ID. This could be used by other organizations to access your [place] medical records  [id number]         Blood Pressure from Last 3 Encounters:   17 125/82   17 124/78   16 131/80    Weight from Last 3 Encounters:   17 61.2 kg (135 lb)   17 61.2 kg (135 lb)   16 59.9 kg (132 lb)              We Performed the Following     CHIROPRAC MANIP,SPINAL,1-2 REGIONS     THERAPEUTIC EXERCISES        Primary Care Provider    Physician No Ref-Primary       NO REF-PRIMARY PHYSICIAN        Equal Access to Services     [place] : Hadii aad ku hadasho Soomaali, waaxda luqadaha, qaybta kaalmada adeegyada, anamika johnson . So [facility] [phone].    ATENCIÓN: Si habla español, tiene a garcia disposición servicios gratuitos de asistencia lingüística. Llame al [phone].    We comply with applicable federal civil rights laws and [place] laws. We do not discriminate on the basis of race, color, national origin, age, disability, sex, sexual orientation, or gender identity.            Thank you!     Thank you for choosing [place] [facility]  [place] [facility]  for your care. Our goal is always to provide you with excellent care. Hearing back from our patients is " one way we can continue to improve our services. Please take a few minutes to complete the written survey that you may receive in the mail after your visit with us. Thank you!             Your Updated Medication List - Protect others around you: Learn how to safely use, store and throw away your medicines at www.disposemymeds.org.          This list is accurate as of: 11/24/17 11:33 PM.  Always use your most recent med list.                   Brand Name Dispense Instructions for use Diagnosis    NAPROXEN PO           NEW MED

## 2017-11-24 NOTE — PROGRESS NOTES
[place] [facility]      Subjective:  Claribel Das   37 year old   female    CC: chronic pain right knee, B hips, lower back  Medications reviewed: Denies   Visit: 3/6  Goal: sand for 30 minutes without pain, sit for 30 minutes without hip pain  Location: general lower back, R medial knee, B hips   JORJE: Surgery for right knee July 2016, previous back surgery, torn meniscus in left knee  Pain: varies but always 1/10, worse with activity   Previous History: Surgery L5/S1, Knee surgery scope on R  Progression: not changing over last couple years   Quality: always feels off, feels like right foot is rotated out, B hamstrings and hips are very tight, medial knee soreness on right  Radiation: Denies   Pain is worse with: running, sitting for long periods  Pain is better with: stretching  Timing: frequent pain  Under care: has seen multiple providers from MD's to PT's  Imaging: in past  Social: alert, oriented, and active. Works as RN.     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 Notes mild improvement. Notes back and hips are doing well. Medial knee is still sore with standing for long periods. Denies any new issues. Tolerating treatment well.     Objective:  Inspection:  No SDD  Scars on right knee look normal     Palpation:  No specific pain  Palpable soreness over R medial knee, General hips, lower back  Myofascitis 2/4 noted over LPS, B hip ER's, R adductors     ROM:  Lumbar flexion   90/90, tightness over lower back and B hamstrings   Lumbar extension  30/30, lower back discomfort   Right Hip IR  24/45  Left Hip IR  39/45  Right Hip ER  54/60  Left  hip ER  48/60  Knee flexion full with tightness and pain over anterior knee on right  Knee abduction limited 7 degrees on right with discomfort over medial knee  Knee extension full    MMT:  Left glute med 4/5 with no pain  Right glute med 4/5 with no pain  Left TFL 4/5 with no pain  Right TFL 4/5 with no pain  Left piriformis 5/5 with no pain  Right piriformis 5/5  with no pain    MET:  Right short leg    Squat:  Shift to right  Foot flare to right  Arm drop on right    Lunge:  Right knee genu valgum  Right knee cross over     NAL:  Restricted SI on right side    Ortho:  SLR (tightness B hamstring), kemps (mild lower back discomfort)    Assessment:  NAL with associated myofascitis and weakness  Lumbago: post surgery  Hip Pain B  Knee pain R: Post surgery     Plan:  Patient tolerated treatment well today  Treatment Time: 45 minutes  97997 manipulation 1-2 segments: MET, Hip LAD, (NO manual adjustments)  84256 Manual therapy: (ART, Graston, Strain Counter Strain, Fascial Manipulation, Cupping) performed over area of R adductors, R hamstring, R hip ER's, L hip IR's, B psoas   31071 therapeutic exercise (20 minutes): review of some current PT exercises, bottom leg clam rotation X30  Strapping: hip IR on R, hip ER left  Taping:  Next visit: 1 week    Magnus Austin DC, MEd, ATC

## 2017-12-05 ENCOUNTER — THERAPY VISIT (OUTPATIENT)
Dept: CHIROPRACTIC MEDICINE | Facility: CLINIC | Age: 37
End: 2017-12-05
Payer: COMMERCIAL

## 2017-12-05 DIAGNOSIS — G89.29 CHRONIC PAIN OF RIGHT KNEE: ICD-10-CM

## 2017-12-05 DIAGNOSIS — G89.29 CHRONIC LOW BACK PAIN WITHOUT SCIATICA, UNSPECIFIED BACK PAIN LATERALITY: ICD-10-CM

## 2017-12-05 DIAGNOSIS — M79.10 MYALGIA: ICD-10-CM

## 2017-12-05 DIAGNOSIS — M99.05 SOMATIC DYSFUNCTION OF PELVIS REGION: Primary | ICD-10-CM

## 2017-12-05 DIAGNOSIS — M54.50 CHRONIC LOW BACK PAIN WITHOUT SCIATICA, UNSPECIFIED BACK PAIN LATERALITY: ICD-10-CM

## 2017-12-05 DIAGNOSIS — M25.561 CHRONIC PAIN OF RIGHT KNEE: ICD-10-CM

## 2017-12-05 PROCEDURE — 97110 THERAPEUTIC EXERCISES: CPT | Performed by: CHIROPRACTOR

## 2017-12-05 PROCEDURE — 98940 CHIROPRACT MANJ 1-2 REGIONS: CPT | Mod: AT | Performed by: CHIROPRACTOR

## 2017-12-05 NOTE — PROGRESS NOTES
[place] [facility]      Subjective:  Claribel Das   37 year old   female    CC: chronic pain right knee, B hips, lower back  Medications reviewed: Denies   Visit: 4/6  Goal: sand for 30 minutes without pain, sit for 30 minutes without hip pain  Location: general lower back, R medial knee, B hips   JORJE: Surgery for right knee July 2016, previous back surgery, torn meniscus in left knee  Pain: varies but always 1/10, worse with activity   Previous History: Surgery L5/S1, Knee surgery scope on R  Progression: not changing over last couple years   Quality: always feels off, feels like right foot is rotated out, B hamstrings and hips are very tight, medial knee soreness on right  Radiation: Denies   Pain is worse with: running, sitting for long periods  Pain is better with: stretching  Timing: frequent pain  Under care: has seen multiple providers from MD's to PT's  Imaging: in past  Social: alert, oriented, and active. Works as RN.     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 Notes right medial knee discomfort. Right posterior hip is also tight. I asked if she wanted me to changed anything and she notes she is slowly improving and wants me to continue. She notes her back is doing well. Denies any new issues. She is working on active care program.     Objective:  Inspection:  No SDD  Scars on right knee look normal     Palpation:  No specific pain  Palpable soreness over R medial knee, General hips, lower back  Myofascitis 2/4 noted over LPS, B hip ER's, R adductors     ROM:  Lumbar flexion   90/90, tightness over lower back and B hamstrings   Lumbar extension  30/30, lower back discomfort   Right Hip IR  24/45  Left Hip IR  39/45  Right Hip ER  54/60  Left  hip ER  48/60  Knee flexion full with tightness and pain over anterior knee on right  Knee abduction limited 7 degrees on right with discomfort over medial knee  Knee extension full    MMT:  Left glute med 4/5 with no pain  Right glute med 4/5 with no  pain  Left TFL 4/5 with no pain  Right TFL 4/5 with no pain  Left piriformis 5/5 with no pain  Right piriformis 5/5 with no pain    MET:  Right short leg    Squat:  Shift to right  Foot flare to right  Arm drop on right    Lunge:  Right knee genu valgum  Right knee cross over     NAL:  Restricted SI on right side    Ortho:  SLR (tightness B hamstring), kemps (mild lower back discomfort)    Assessment:  NAL with associated myofascitis and weakness  Lumbago: post surgery  Hip Pain B  Knee pain R: Post surgery     Plan:  Patient tolerated treatment well today  Treatment Time: 45 minutes  58538 manipulation 1-2 segments: MET, Hip LAD, (NO manual adjustments)  59011 Manual therapy: (ART, Graston, Strain Counter Strain, Fascial Manipulation, Cupping) performed over area of R adductors, R hamstring, R hip ER's, L hip IR's, B psoas   98163 therapeutic exercise (20 minutes): review of some current PT exercises, bottom leg clam rotation X30, child pose stretch  Strapping: hip IR on R, hip ER left  Taping:  Next visit: 1 week  Dry needle: discussed today for next session.     Magnus Austin DC, MEd, ATC

## 2017-12-05 NOTE — MR AVS SNAPSHOT
"              After Visit Summary   12/5/2017    Claribel Das    MRN: [id number]           Patient Information     Date Of Birth          1980        Visit Information        Provider Department      12/5/2017 4:00 PM Magnus Austin DC [facility] Athletic [facility] [place] Chiropractor        Today's Diagnoses     Somatic dysfunction of pelvis region    -  1    Myalgia        Chronic pain of right knee        Chronic low back pain without sciatica, unspecified back pain laterality           Follow-ups after your visit        Your next 10 appointments already scheduled     Dec 29, 2017  3:15 PM CST   [facility] Chiropractor with Magnus YVES Austin   [facility] Athletic [facility] [place] Chiropractor ([place])    [address] [place] [address]  [address]  [place] [address] [phone]              Who to contact     If you have questions or need follow up information about today's clinic visit or your schedule please contact [facility] [place] CHIROPRACTOR directly at [phone].  Normal or non-critical lab and imaging results will be communicated to you by The Shock 3D Grouphart, letter or phone within 4 business days after the clinic has received the results. If you do not hear from us within 7 days, please contact the clinic through Playtikat or phone. If you have a critical or abnormal lab result, we will notify you by phone as soon as possible.  Submit refill requests through Pressable or call your pharmacy and they will forward the refill request to us. Please allow 3 business days for your refill to be completed.          Additional Information About Your Visit        The Shock 3D GroupharGlobalPay Information     Pressable lets you send messages to your doctor, view your test results, renew your prescriptions, schedule appointments and more. To sign up, go to [email or web address] . Click on \"Log in\" on the left side of the screen, which will take you to the Welcome page. Then click on " "\"Sign up Now\" on the right side of the page.     You will be asked to enter the access code listed below, as well as some personal information. Please follow the directions to create your username and password.     Your access code is: [email or web address]  Expires: 2018  3:26 PM     Your access code will  in 90 days. If you need help or a new code, please call your [place] clinic or [phone].        Care EveryWhere ID     This is your Care EveryWhere ID. This could be used by other organizations to access your [place] medical records  [id number]         Blood Pressure from Last 3 Encounters:   17 125/82   17 124/78   16 131/80    Weight from Last 3 Encounters:   17 61.2 kg (135 lb)   17 61.2 kg (135 lb)   16 59.9 kg (132 lb)              We Performed the Following     CHIROPRAC MANIP,SPINAL,1-2 REGIONS     THERAPEUTIC EXERCISES        Primary Care Provider Fax #    Physician No Ref-Primary [phone]       No address on file        Equal Access to Services     BRIGIDO DE : Hadii vanita william Soaislinn, waapoloniada yara, qaybta kaalmabharati mayes, anamika johnson . So [facility] [phone].    ATENCIÓN: Si habla español, tiene a garcia disposición servicios gratuitos de asistencia lingüística. Cathleen al [phone].    We comply with applicable federal civil rights laws and [place] laws. We do not discriminate on the basis of race, color, national origin, age, disability, sex, sexual orientation, or gender identity.            Thank you!     [facility] you for choosing [facility]  for your care. Our goal is always to provide you with excellent care. Hearing back from our patients is one way we can continue to improve our services. Please take a few minutes to complete the written survey that you may receive in the mail after your visit with us. Thank you!             Your Updated Medication List - Protect " others around you: Learn how to safely use, store and throw away your medicines at www.disposemymeds.org.          This list is accurate as of: 12/5/17 11:59 PM.  Always use your most recent med list.                   Brand Name Dispense Instructions for use Diagnosis    NAPROXEN PO           NEW MED

## 2017-12-29 ENCOUNTER — THERAPY VISIT (OUTPATIENT)
Dept: CHIROPRACTIC MEDICINE | Facility: CLINIC | Age: 37
End: 2017-12-29
Payer: COMMERCIAL

## 2017-12-29 DIAGNOSIS — G89.29 CHRONIC LOW BACK PAIN WITHOUT SCIATICA, UNSPECIFIED BACK PAIN LATERALITY: ICD-10-CM

## 2017-12-29 DIAGNOSIS — M25.561 CHRONIC PAIN OF RIGHT KNEE: ICD-10-CM

## 2017-12-29 DIAGNOSIS — M54.50 CHRONIC LOW BACK PAIN WITHOUT SCIATICA, UNSPECIFIED BACK PAIN LATERALITY: ICD-10-CM

## 2017-12-29 DIAGNOSIS — M99.05 SOMATIC DYSFUNCTION OF PELVIS REGION: Primary | ICD-10-CM

## 2017-12-29 DIAGNOSIS — M79.10 MYALGIA: ICD-10-CM

## 2017-12-29 DIAGNOSIS — G89.29 CHRONIC PAIN OF RIGHT KNEE: ICD-10-CM

## 2017-12-29 PROCEDURE — 97110 THERAPEUTIC EXERCISES: CPT | Performed by: CHIROPRACTOR

## 2017-12-29 PROCEDURE — 98940 CHIROPRACT MANJ 1-2 REGIONS: CPT | Mod: AT | Performed by: CHIROPRACTOR

## 2017-12-29 NOTE — MR AVS SNAPSHOT
After Visit Summary   12/29/2017    Claribel Das    MRN: [id number]           Patient Information     Date Of Birth          1980        Visit Information        Provider Department      12/29/2017 3:15 PM Magnus Austin DC [facility] [place] Chiropractor        Today's Diagnoses     Somatic dysfunction of pelvis region    -  1    Myalgia        Chronic pain of right knee        Chronic low back pain without sciatica, unspecified back pain laterality           Follow-ups after your visit        Your next 10 appointments already scheduled     Jan 19, 2018  4:45 PM CST   [place] Chiropractor with Magnus Austin DC   [facility] [place] [facility] ([facility] [place])    [address] [place] [address]  [address]  [place] [address]   [phone]            Feb 08, 2018  1:45 PM CST   [facility] Chiropractor with Magnus Austin DC   [facility] ([facility] [place])    [address] [place] [address]  [address]  [place] [address]   [phone]              Who to contact     If you have questions or need follow up information about today's clinic visit or your schedule please contact [facility] [place] [facility] directly at [phone].  Normal or non-critical lab and imaging results will be communicated to you by PenPathhart, letter or phone within 4 business days after the clinic has received the results. If you do not hear from us within 7 days, please contact the clinic through PenPathhart or phone. If you have a critical or abnormal lab result, we will notify you by phone as soon as possible.  Submit refill requests through Moqom or call your pharmacy and they will forward the refill request to us. Please allow 3 business days for your refill to be completed.          Additional Information About Your Visit        PenPathhart Information     Moqom lets you send messages to your  "doctor, view your test results, renew your prescriptions, schedule appointments and more. To sign up, go to [email or web address] . Click on \"Log in\" on the left side of the screen, which will take you to the Welcome page. Then click on \"Sign up Now\" on the right side of the page.     You will be asked to enter the access code listed below, as well as some personal information. Please follow the directions to create your username and password.     Your access code is: [email or web address]  Expires: 2018  3:26 PM     Your access code will  in 90 days. If you need help or a new code, please call your [place] clinic or [phone].        Care EveryWhere ID     This is your Care EveryWhere ID. This could be used by other organizations to access your [place] medical records  [id number]         Blood Pressure from Last 3 Encounters:   17 125/82   17 124/78   16 131/80    Weight from Last 3 Encounters:   17 61.2 kg (135 lb)   17 61.2 kg (135 lb)   16 59.9 kg (132 lb)              We Performed the Following     CHIROPRAC MANIP,SPINAL,1-2 REGIONS     THERAPEUTIC EXERCISES        Primary Care Provider Fax #    Physician No Ref-Primary [phone]       No address on file        Equal Access to Services     POLINA DE : Hadmick Vasquez, waaxda yara, qaybta jossealvalerie mayes, anamika johnson . So [facility] [phone].    ATENCIÓN: Si habla español, tiene a garcia disposición servicios gratuitos de asistencia lingüística. Llame al [phone].    We comply with applicable federal civil rights laws and [place] laws. We do not discriminate on the basis of race, color, national origin, age, disability, sex, sexual orientation, or gender identity.            Thank you!     Thank you for choosing [facility]  [place] [facility]  for your care. Our goal is always to provide you with excellent care. Hearing back from " our patients is one way we can continue to improve our services. Please take a few minutes to complete the written survey that you may receive in the mail after your visit with us. Thank you!             Your Updated Medication List - Protect others around you: Learn how to safely use, store and throw away your medicines at www.disposemymeds.org.          This list is accurate as of: 12/29/17  4:27 PM.  Always use your most recent med list.                   Brand Name Dispense Instructions for use Diagnosis    NAPROXEN PO           NEW MED

## 2017-12-29 NOTE — PROGRESS NOTES
[facility]      Subjective:  Claribel Das   37 year old   female    CC: chronic pain right knee, B hips, lower back  Medications reviewed: Denies   Visit: 5/6  Goal: sand for 30 minutes without pain, sit for 30 minutes without hip pain  Location: general lower back, R medial knee, B hips   JORJE: Surgery for right knee July 2016, previous back surgery, torn meniscus in left knee  Pain: varies but always 1/10, worse with activity   Previous History: Surgery L5/S1, Knee surgery scope on R  Progression: not changing over last couple years   Quality: always feels off, feels like right foot is rotated out, B hamstrings and hips are very tight, medial knee soreness on right  Radiation: Denies   Pain is worse with: running, sitting for long periods  Pain is better with: stretching  Timing: frequent pain  Under care: has seen multiple providers from MD's to PT's  Imaging: in past  Social: alert, oriented, 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 Back is doing better. Is looking at getting some new shoes for running. Notes knee is slightly better and hurts with deep squats. Denies any new issues or any changes in geetha history.     Objective:  Inspection:  No SDD  Scars on right knee look normal     Palpation:  No specific pain  Palpable soreness over R medial knee, General hips, lower back  Myofascitis 2/4 noted over LPS, B hip ER's, R adductors     ROM:  Lumbar flexion   90/90, tightness over lower back and B hamstrings   Lumbar extension  30/30, lower back discomfort   Right Hip IR  24/45  Left Hip IR  39/45  Right Hip ER  54/60  Left  hip ER  48/60  Knee flexion full with tightness and pain over anterior knee on right  Knee abduction limited 7 degrees on right with discomfort over medial knee  Knee extension full    MMT:  Left glute med 4/5 with no pain  Right glute med 4/5 with no pain  Left TFL 4/5 with no pain  Right TFL 4/5 with no pain  Left piriformis 5/5 with no  pain  Right piriformis 5/5 with no pain    MET:  Right short leg    Squat:  Shift to right  Foot flare to right  Arm drop on right    Lunge:  Right knee genu valgum  Right knee cross over     NAL:  Restricted SI on right side    Ortho:  SLR (tightness B hamstring), kemps (mild lower back discomfort)    Assessment:  NAL with associated myofascitis and weakness  Lumbago: post surgery  Hip Pain B  Knee pain R: Post surgery     Plan:  Patient tolerated treatment well today  Treatment Time: 45 minutes  46365 manipulation 1-2 segments: MET, Hip LAD, (NO manual adjustments)  75254 Manual therapy: (ART, Graston, Strain Counter Strain, Fascial Manipulation, Cupping) performed over area of R adductors, R hamstring, R hip ER's, L hip IR's, B psoas   65376 therapeutic exercise (20 minutes): review of some current PT exercises, bottom leg clam rotation X30, child pose stretch  Strapping: hip IR on R, hip ER left  Taping:  Next visit: 2-3 week  Dry needle: discussed today for next session.   Shock wave: 10/3 over medial knee, tolerated well    Magnus Austin DC, MEd, ATC

## 2018-01-19 ENCOUNTER — THERAPY VISIT (OUTPATIENT)
Dept: CHIROPRACTIC MEDICINE | Facility: CLINIC | Age: 38
End: 2018-01-19
Payer: COMMERCIAL

## 2018-01-19 DIAGNOSIS — M79.10 MYALGIA: ICD-10-CM

## 2018-01-19 DIAGNOSIS — M99.05 SOMATIC DYSFUNCTION OF PELVIS REGION: Primary | ICD-10-CM

## 2018-01-19 DIAGNOSIS — G89.29 CHRONIC PAIN OF RIGHT KNEE: ICD-10-CM

## 2018-01-19 DIAGNOSIS — G89.29 CHRONIC LOW BACK PAIN WITHOUT SCIATICA, UNSPECIFIED BACK PAIN LATERALITY: ICD-10-CM

## 2018-01-19 DIAGNOSIS — M25.561 CHRONIC PAIN OF RIGHT KNEE: ICD-10-CM

## 2018-01-19 DIAGNOSIS — M54.50 CHRONIC LOW BACK PAIN WITHOUT SCIATICA, UNSPECIFIED BACK PAIN LATERALITY: ICD-10-CM

## 2018-01-19 PROCEDURE — 97110 THERAPEUTIC EXERCISES: CPT | Performed by: CHIROPRACTOR

## 2018-01-19 PROCEDURE — 98940 CHIROPRACT MANJ 1-2 REGIONS: CPT | Mod: AT | Performed by: CHIROPRACTOR

## 2018-01-19 NOTE — MR AVS SNAPSHOT
"              After Visit Summary   1/19/2018    Claribel Das    MRN: [id number]           Patient Information     Date Of Birth          1980        Visit Information        Provider Department      1/19/2018 4:45 PM Magnus Austin DC [facility] Athletic [facility] [place] Chiropractor        Today's Diagnoses     Somatic dysfunction of pelvis region    -  1    Myalgia        Chronic pain of right knee        Chronic low back pain without sciatica, unspecified back pain laterality           Follow-ups after your visit        Your next 10 appointments already scheduled     Feb 08, 2018  1:45 PM [facility] Chiropractor with Magnus Austin DC   [facility] Athletic [facility] [place] Chiropractor ([facility] [place])    [address] [place] [address]  [address]  [place] [address] [phone]              Who to contact     If you have questions or need follow up information about today's clinic visit or your schedule please contact [facility] [place] CHIROPRA[facility] directly at [phone].  Normal or non-critical lab and imaging results will be communicated to you by "Crossboard Mobile (Formerly Pontiflex[employer] Inc[employer]"hart, letter or phone within 4 business days after the clinic has received the results. If you do not hear from us within 7 days, please contact the clinic through Merust or phone. If you have a critical or abnormal lab result, we will notify you by phone as soon as possible.  Submit refill requests through MaidSafe or call your pharmacy and they will forward the refill request to us. Please allow 3 business days for your refill to be completed.          Additional Information About Your Visit        "Crossboard Mobile [employer]"harMedstro Information     MaidSafe lets you send messages to your doctor, view your test results, renew your prescriptions, schedule appointments and more. To sign up, go to [email or web address] . Click on \"Log in\" on the left side of the screen, which will take you to the Welcome page. Then click on " "\"Sign up Now\" on the right side of the page.     You will be asked to enter the access code listed below, as well as some personal information. Please follow the directions to create your username and password.     Your access code is: [email or web address]  Expires: 2018  3:26 PM     Your access code will  in 90 days. If you need help or a new code, please call your [place] clinic or [phone].        Care EveryWhere ID     This is your Care EveryWhere ID. This could be used by other organizations to access your [place] medical records  [id number]         Blood Pressure from Last 3 Encounters:   17 125/82   17 124/78   16 131/80    Weight from Last 3 Encounters:   17 61.2 kg (135 lb)   17 61.2 kg (135 lb)   16 59.9 kg (132 lb)              We Performed the Following     CHIROPRAC MANIP,SPINAL,1-2 REGIONS     THERAPEUTIC EXERCISES        Primary Care Provider Fax #    Physician No Ref-Primary [phone]       No address on file        Equal Access to Services     BRIGIDO DE : Hadii vanita william Soaislinn, waapoloniada yara, qaybta kaalmabharati mayes, anamika johnson . So [facility] [phone].    ATENCIÓN: Si habla español, tiene a garcia disposición servicios gratuitos de asistencia lingüística. Cathleen al [phone].    We comply with applicable federal civil rights laws and [place] laws. We do not discriminate on the basis of race, color, national origin, age, disability, sex, sexual orientation, or gender identity.            Thank you!     Thank you for choosing [facility]  for your care. Our goal is always to provide you with excellent care. Hearing back from our patients is one way we can continue to improve our services. Please take a few minutes to complete the written survey that you may receive in the mail after your visit with us. Thank you!             Your Updated Medication List - Protect " others around you: Learn how to safely use, store and throw away your medicines at www.disposemymeds.org.          This list is accurate as of: 1/19/18  7:59 PM.  Always use your most recent med list.                   Brand Name Dispense Instructions for use Diagnosis    NAPROXEN PO           NEW MED

## 2018-01-20 NOTE — PROGRESS NOTES
[facility]      Subjective:  Claribel Das   37 year old   female    CC: chronic pain right knee, B hips, lower back  Medications reviewed: Denies   Visit: 5/6  Goal: sand for 30 minutes without pain, sit for 30 minutes without hip pain  Location: general lower back, R medial knee, B hips   JORJE: Surgery for right knee July 2016, previous back surgery, torn meniscus in left knee  Pain: varies but always 1/10, worse with activity   Previous History: Surgery L5/S1, Knee surgery scope on R  Progression: not changing over last couple years   Quality: always feels off, feels like right foot is rotated out, B hamstrings and hips are very tight, medial knee soreness on right  Radiation: Denies   Pain is worse with: running, sitting for long periods  Pain is better with: stretching  Timing: frequent pain  Under care: has seen multiple providers from MD's to PT's  Imaging: in past  Social: alert, oriented, 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 Back is doing better.Notes slow progress. Notes shock wave might have helped a little and wants to continue. Denies any new issues. Notes some quat tightness on left. We went over some active isolated stretching with her to see if that helped. She denies any other changes in health history. Her active care is going well.      Objective:  Inspection:  No SDD  Scars on right knee look normal     Palpation:  No specific pain  Palpable soreness over R medial knee, General hips, lower back  Myofascitis 2/4 noted over LPS, B hip ER's, R adductors     ROM:  Lumbar flexion   90/90, tightness over lower back and B hamstrings   Lumbar extension  30/30, lower back discomfort   Right Hip IR  24/45  Left Hip IR  39/45  Right Hip ER  54/60  Left  hip ER  48/60  Knee flexion full with tightness and pain over anterior knee on right  Knee abduction limited 7 degrees on right with discomfort over medial knee  Knee extension full    MMT:  Left glute  med 4/5 with no pain  Right glute med 4/5 with no pain  Left TFL 4/5 with no pain  Right TFL 4/5 with no pain  Left piriformis 5/5 with no pain  Right piriformis 5/5 with no pain    MET:  Right short leg    Squat:  Shift to right  Foot flare to right  Arm drop on right    Lunge:  Right knee genu valgum  Right knee cross over     NAL:  Restricted SI on right side    Ortho:  SLR (tightness B hamstring), kemps (mild lower back discomfort)    Assessment:  NAL with associated myofascitis and weakness  Lumbago: post surgery  Hip Pain B  Knee pain R: Post surgery     Plan:  Patient tolerated treatment well today  Treatment Time: 45 minutes  88452 manipulation 1-2 segments: MET, Hip LAD, (NO manual adjustments)  93092 Manual therapy: (ART, Graston, Strain Counter Strain, Fascial Manipulation, Cupping) performed over area of R adductors, R hamstring, R hip ER's, L hip IR's, B psoas   35427 therapeutic exercise (20 minutes): review of some current PT exercises, bottom leg clam rotation X30, child pose stretch  Strapping: hip IR on R, hip ER left  Taping:  Next visit: 2-3 week  Dry needle: discussed today for next session.   Shock wave: 15/3 over medial knee, tolerated well    Magnus Austin DC, MEd, ATC

## 2018-02-08 ENCOUNTER — THERAPY VISIT (OUTPATIENT)
Dept: CHIROPRACTIC MEDICINE | Facility: CLINIC | Age: 38
End: 2018-02-08
Payer: COMMERCIAL

## 2018-02-08 DIAGNOSIS — G89.29 CHRONIC LOW BACK PAIN WITHOUT SCIATICA, UNSPECIFIED BACK PAIN LATERALITY: ICD-10-CM

## 2018-02-08 DIAGNOSIS — M79.10 MYALGIA: ICD-10-CM

## 2018-02-08 DIAGNOSIS — M54.50 CHRONIC LOW BACK PAIN WITHOUT SCIATICA, UNSPECIFIED BACK PAIN LATERALITY: ICD-10-CM

## 2018-02-08 DIAGNOSIS — M25.561 CHRONIC PAIN OF RIGHT KNEE: ICD-10-CM

## 2018-02-08 DIAGNOSIS — G89.29 CHRONIC PAIN OF RIGHT KNEE: ICD-10-CM

## 2018-02-08 DIAGNOSIS — M99.05 SOMATIC DYSFUNCTION OF PELVIS REGION: Primary | ICD-10-CM

## 2018-02-08 PROCEDURE — 97110 THERAPEUTIC EXERCISES: CPT | Performed by: CHIROPRACTOR

## 2018-02-08 PROCEDURE — 98940 CHIROPRACT MANJ 1-2 REGIONS: CPT | Performed by: CHIROPRACTOR

## 2018-02-08 NOTE — PROGRESS NOTES
[facility]      Subjective:  Claribel Das   37 year old   female    CC: chronic pain right knee, B hips, lower back  Medications reviewed: Denies   Visit: 6  Goal: sand for 30 minutes without pain, sit for 30 minutes without hip pain  Location: general lower back, R medial knee, B hips R medial and lateral knee  JORJE: Surgery for right knee July 2016, previous back surgery, torn meniscus in left knee  Pain: varies but always 1/10, worse with activity   Previous History: Surgery L5/S1, Knee surgery scope on R  Under care: has seen multiple providers from MD's to PT's  Imaging: in past  Social: alert, oriented, 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 Back is doing better.Notes slow progress. Notes shock wave might have helped a little and wants to continue. States last Monday with snow she fell and knee went into forced flexion. Hurt for about 2 days. No swelling or bruising and now it is doing well. She denies any new issues. She is working on active care. Pleased with progress. Started swimming and that is going well.     Objective:  Inspection:  No SDD  Scars on right knee look normal     Palpation:  No specific pain  Palpable soreness over R medial knee, General hips, lower back  Myofascitis 2/4 noted over LPS, B hip ER's, R adductors     ROM:  Lumbar flexion   90/90, tightness over lower back and B hamstrings   Lumbar extension  30/30, lower back discomfort   Right Hip IR  24/45  Left Hip IR  39/45  Right Hip ER  54/60  Left  hip ER  48/60  Knee flexion full with tightness and pain over anterior knee on right  Knee abduction limited 7 degrees on right with discomfort over medial knee  Knee extension full    MMT:  Left glute med 4/5 with no pain  Right glute med 4/5 with no pain  Left TFL 4/5 with no pain  Right TFL 4/5 with no pain  Left piriformis 5/5 with no pain  Right piriformis 5/5 with no pain    MET:  Right short leg    Squat:  Shift to right  Foot flare  to right  Arm drop on right    Lunge:  Right knee genu valgum  Right knee cross over     NAL:  Restricted SI on right side    Ortho:  SLR (tightness B hamstring), kemps (mild lower back discomfort)    Assessment:  NAL with associated myofascitis and weakness  Lumbago: post surgery  Hip Pain B  Knee pain R: Post surgery     Plan:  Patient tolerated treatment well today  Treatment Time: 45 minutes  30207 manipulation 1-2 segments: MET, Hip LAD, (NO manual adjustments)  61269 Manual therapy: (ART, Graston, Strain Counter Strain, Fascial Manipulation, Cupping) performed over area of R adductors, R hamstring, R hip ER's, L hip IR's, B psoas   40650 therapeutic exercise (20 minutes): review of some current PT exercises, bottom leg clam rotation X30, child pose stretch  Review of glute med exercise (clams, side lying leg lifts, band walks), glute max (single leg bridge, squat), balance (runner pull)  Strapping: hip IR on R, hip ER left  Taping:  Next visit: 2-3 week  Dry needle: discussed today for next session.   Shock wave: 15/3 over medial knee, tolerated well    Magnus Austin DC, MEd, ATC

## 2018-07-06 ENCOUNTER — OFFICE VISIT (OUTPATIENT)
Dept: ORTHOPEDICS | Facility: CLINIC | Age: 38
End: 2018-07-06
Payer: COMMERCIAL

## 2018-07-06 VITALS
WEIGHT: 135 LBS | SYSTOLIC BLOOD PRESSURE: 133 MMHG | DIASTOLIC BLOOD PRESSURE: 87 MMHG | BODY MASS INDEX: 18.9 KG/M2 | HEIGHT: 71 IN

## 2018-07-06 DIAGNOSIS — G89.29 CHRONIC PAIN OF RIGHT KNEE: Primary | ICD-10-CM

## 2018-07-06 DIAGNOSIS — M54.50 CHRONIC LOW BACK PAIN WITHOUT SCIATICA, UNSPECIFIED BACK PAIN LATERALITY: ICD-10-CM

## 2018-07-06 DIAGNOSIS — M25.561 CHRONIC PAIN OF RIGHT KNEE: Primary | ICD-10-CM

## 2018-07-06 DIAGNOSIS — G89.29 CHRONIC LOW BACK PAIN WITHOUT SCIATICA, UNSPECIFIED BACK PAIN LATERALITY: ICD-10-CM

## 2018-07-06 DIAGNOSIS — M21.70 LEG LENGTH DISCREPANCY: ICD-10-CM

## 2018-07-06 PROCEDURE — 99214 OFFICE O/P EST MOD 30 MIN: CPT | Performed by: FAMILY MEDICINE

## 2018-07-06 NOTE — PROGRESS NOTES
"[facility]   Clinic Visit s 2018    PCP: No Ref-Primary, Physician      Claribel is a 38 year old female who is seen as self referral for   Chief Complaint   Patient presents with     Left Hip - Pain     Left Knee - Pain     Right Hip - Pain     Right Knee - Pain       Injury: {Precipitating Event:837421::\"Patient reports insidious onset without acute precipitating event.\"}     {HAND DOMINANCE:787844}      Location of Pain: {side:5002} {AJ Location Joints:171951} {AJ location on limb:035523}, {AJ radiating pain:776400}   Duration of Pain: *** {DAY/WEEK/MONTH/YEAR:428677}  Rating of Pain at worst: {PAIN SCALE:853456}  Rating of Pain Currently: {PAIN SCALE:249295}  Pain is better with: {Pain (activities that relieve):717172}   Pain is worse with: {Worsenin}   Treatment so far consists of: {AJ treatment options:457611}  Associated symptoms: {AJassociatedSX:549423}  Recent imaging completed: {Imagin}.  Prior History of related problems: ***    Social History: {Work history:680898}     No past medical history on file.    Patient Active Problem List    Diagnosis Date Noted     Somatic dysfunction of pelvis region 10/30/2017     Priority: Medium     Myalgia 10/30/2017     Priority: Medium     Right knee pain 09/15/2016     Priority: Medium     S/P meniscectomy 09/15/2016     Priority: Medium     Effusion of right knee 09/15/2016     Priority: Medium     Low back pain 2014     Priority: Medium     Diagnosis updated by automated process. Provider to review and confirm.       Lumbar radiculopathy 2014     Priority: Medium       Family History   Problem Relation Age of Onset     Cerebrovascular Disease Maternal Grandmother      Cerebrovascular Disease Maternal Grandfather      Cerebrovascular Disease Paternal Grandmother      Alcohol/Drug Brother      Osteoperosis Maternal Grandmother      Psychotic Disorder Brother        Social History     Social History     " Marital status:      Spouse name: N/A     Number of children: N/A     Years of education: N/A     Social History Main Topics     Smoking status: Never Smoker     Smokeless tobacco: Not on file     Alcohol use Not on file     Drug use: Not on file     Sexual activity: Not on file     Other Topics Concern     Not on file     Social History Narrative       Past Surgical History:   Procedure Laterality Date     BACK SURGERY  2012    lumbar microdiscectomy      SECTION       HC KNEE SCOPE,SINGLE MENISECTOMY Right 1997         ROS      Physical Exam    Ortho Exam

## 2018-07-06 NOTE — MR AVS SNAPSHOT
After Visit Summary   7/6/2018    Claribel Das    MRN: [id number]           Patient Information     Date Of Birth          1980        Visit Information        Provider Department      7/6/2018 4:20 PM Bo Trinh MD [facility] [place]        Today's Diagnoses     Chronic pain of right knee    -  1    Chronic low back pain without sciatica, unspecified back pain laterality        Leg length discrepancy           Follow-ups after your visit        Additional Services     [facility] PT, HAND, AND CHIROPRACTIC REFERRAL       **This order will print in the [facility] Scheduling Office**    Physical Therapy, Hand Therapy and Chiropractic Care are available through:    *[facility]  *[facility]  *[facility]    Call one number to schedule at any of the above locations: [phone].    Your provider has referred you to: Chiropractic at [facility] or REBECCA - Magnus Austin at [facility] or [facility]    Indication/Reason for Referral:    Chronic pain of right knee  Chronic low back pain without sciatica, unspecified back pain laterality  Leg length discrepancy    Onset of Illness:   Therapy Orders: Evaluate and Treat  Special Programs: None  Special Request: None    Kezia Mast      Additional Comments for the Therapist or Chiropractor:     Please be aware that coverage of these services is subject to the terms and limitations of your health insurance plan.  Call member services at your health plan with any benefit or coverage questions.      Please bring the following to your appointment:    *Your personal calendar for scheduling future appointments  *Comfortable clothing                  Future tests that were ordered for you today     Open Future Orders        Priority Expected Expires Ordered    XR Leg Length Evaluation Routine 7/6/2018 7/6/2019 7/6/2018            Who to contact     If you have questions or need follow up  "information about today's clinic visit or your schedule please contact [facility] [place] directly at [phone].  Normal or non-critical lab and imaging results will be communicated to you by MyChart, letter or phone within 4 business days after the clinic has received the results. If you do not hear from us within 7 days, please contact the clinic through [employer] or phone. If you have a critical or abnormal lab result, we will notify you by phone as soon as possible.  Submit refill requests through [employer] or call your pharmacy and they will forward the refill request to us. Please allow 3 business days for your refill to be completed.          Additional Information About Your Visit        [employer] Information     [employer] lets you send messages to your doctor, view your test results, renew your prescriptions, schedule appointments and more. To sign up, go to [email or web address] . Click on \"Log in\" on the left side of the screen, which will take you to the Welcome page. Then click on \"Sign up Now\" on the right side of the page.     You will be asked to enter the access code listed below, as well as some personal information. Please follow the directions to create your username and password.     Your access code is: [email or web address]  Expires: 10/4/2018  3:51 PM     Your access code will  in 90 days. If you need help or a new code, please call your [facility] clinic or [phone].        Care EveryWhere ID     This is your Care EveryWhere ID. This could be used by other organizations to access your [facility] medical records  [id number]        Your Vitals Were     Height BMI (Body Mass Index)                5' 10.5\" (1.791 m) 19.1 kg/m2           Blood Pressure from Last 3 Encounters:   18 133/87   17 125/82   17 124/78    Weight from Last 3 Encounters:   18 135 lb (61.2 kg)   17 135 lb (61.2 kg)   17 135 lb (61.2 kg)              We Performed " the Following     [facility], HAND, AND CH[facility]RACTIC REFERRAL        Primary Care Provider Fax #    Physician No Ref-Primary [phone]       No address on file        Equal Access to Services     GERHARDBRIGIDO KENISHA : Josue vanita irby nataliia Vasquez, len yamelmitch, billy kafalguni mayes, anamika ontiveros. [facility] [phone].    ATENCIÓN: Si habla español, tiene a garcia disposición servicios gratuitos de asistencia lingüística. Llame al [phone].    We comply with applicable federal civil rights laws and [place] laws. We do not discriminate on the basis of race, color, national origin, age, disability, sex, sexual orientation, or gender identity.            Thank you!     Thank you for choosing [facility] [place]  for your care. Our goal is always to provide you with excellent care. Hearing back from our patients is one way we can continue to improve our services. Please take a few minutes to complete the written survey that you may receive in the mail after your visit with us. Thank you!             Your Updated Medication List - Protect others around you: Learn how to safely use, store and throw away your medicines at www.disposemymeds.org.          This list is accurate as of 7/6/18  4:47 PM.  Always use your most recent med list.                   Brand Name Dispense Instructions for use Diagnosis    NAPROXEN PO           NEW MED

## 2018-07-06 NOTE — PROGRESS NOTES
Musculoskeletal Problem         [facility]   Follow-up Visit s Jul 6, 2018    PCP: Kaylee Ref-Primary, Physician      Subjective:  Claribel is a 38 year old female who is seen in follow up for evaluation of   Chief Complaint   Patient presents with     Left Hip - Pain     Right Hip - Pain     Right Knee - RECHECK     Her last visit was on 4/20/17.  Since that time, symptoms have been about the same in her right knee. Today she would like to discuss her hips. Claribel Das is accompanied today by self.     Primarily has continued RIGHT knee pain and stiffness.    She is concerned of her bilateral hips and the alignment. She also has concerns of leg length discrepancy and overall alignment of the lower extremities.     Patient has noticed a stable course of symptoms with home exercise program and chiropractor treatment.  Patient has continued slight swelling in right knee. Also complains of knee stiffness and fatigue with physical activity.     Difficulty fully straightening RIGHT leg when standing up, wonders if LLD on LEFT shorter. Some back stiffness; hx of lumbar surgery.    Patient is using no aids.      Patient's past medical, surgical, social and family histories are reviewed today.    Patient Active Problem List    Diagnosis Date Noted     Somatic dysfunction of pelvis region 10/30/2017     Priority: Medium     Myalgia 10/30/2017     Priority: Medium     Right knee pain 09/15/2016     Priority: Medium     S/P meniscectomy 09/15/2016     Priority: Medium     Effusion of right knee 09/15/2016     Priority: Medium     Low back pain 12/04/2014     Priority: Medium     Diagnosis updated by automated process. Provider to review and confirm.       Lumbar radiculopathy 12/04/2014     Priority: Medium       Family History   Problem Relation Age of Onset     Cerebrovascular Disease Maternal Grandmother      Cerebrovascular Disease Maternal Grandfather      Cerebrovascular Disease Paternal  "Grandmother      Alcohol/Drug Brother      Osteoperosis Maternal Grandmother      Psychotic Disorder Brother        History     Social History     Marital Status:      Spouse Name: N/A     Number of Children: N/A     Years of Education: N/A     Social History Main Topics     Smoking status: Never Smoker      Smokeless tobacco: None     Alcohol Use: None     Drug Use: None     Sexually Active: None     Past Surgical History:   Procedure Laterality Date     BACK SURGERY  2012    lumbar microdiscectomy      SECTION  2010     HC KNEE SCOPE,SINGLE MENISECTOMY Right 1997       Review of Systems   Musculoskeletal: Positive for joint pain.   All other systems reviewed and are negative.        Physical Exam   Musculoskeletal:        Right knee: Lateral joint line tenderness noted.     /87  Ht 5' 10.5\" (1.791 m)  Wt 135 lb (61.2 kg)  BMI 19.1 kg/m2  Constitutional:well-developed, well-nourished, and in no distress.   Cardiovascular: Intact distal pulses.    Neurological: alert. Gait Normal:   Gait, station, stance, and balance appear normal for age  Skin: Skin is warm and dry.   Psychiatric: Mood and affect normal.   Respiratory: unlabored, speaks in full sentences  Lymph: no LAD, no lymphangitis      Right Knee Exam   Swelling: Mild  Effusion: Yes    Tenderness   The patient is experiencing tenderness in the lateral joint line.    Range of Motion   Extension: Normal  Flexion:     Abnormal    Tests   McMurrays:  Medial - Negative      Lateral - Negative  Lachman:  Anterior - Negative    Posterior - Negative  Drawer:       Anterior - Negative    Posterior - Negative  Varus:  Negative  Valgus: Negative  Pivot Shift: Negative  Patellar Apprehension: Yes    Comments:  Pain with full flexion    Left Hip Exam   Gait: Normal.    Tenderness   None    Range of Motion   Extension:            Normal  Flexion:                 Normal  Internal Rotation:  30  External Rotation: 60  Abduction:            " Normal  Adduction:            Normal    Muscle Strength   Abduction:  5/5  Adduction:  5/5  Flexion:      5/5    Tests   Ashwin: Negative  Charles:  N/t    Right Hip Exam   Gait: Normal.    Tenderness   None    Range of Motion   Extension:            Normal  Flexion:                 Normal  Internal Rotation:  30  External Rotation: 60  Abduction:            Normal  Adduction:            Normal    Muscle Strength   Abduction:  5/5  Adduction:  5/5  Flexion:      5/5    Tests   Ashwin: Negative  Charles:  N/t    Back Exam   Sensation: Normal.  Gait: Normal.    Tenderness   None    Range of Motion   Flexion:                    60  Extension:                Normal  Lateral Bend Left:    Normal  Lateral Bend Right:  Normal  Rotation Right:         Normal  Rotation Left:           Normal  SLR    Right: Negative  Left:    Negative    Muscle Strength   Normal back strength    Comments:  RIGHT hip slightly higher than LEFT   No external scoliosis noted          EXAM: MRI EXAMINATION OF THE RIGHT KNEE  CLINICAL INFORMATION: Bilateral knee pain. Pain in the right knee for approximately 5 months. Partial medial meniscectomy 7/28/2016. Prior partial meniscectomy in 1997.  TECHNICAL INFORMATION: MRI of the knee was performed without contrast. Proton density weighted images were obtained in all 3 planes. Additional T2 weighted coronal images were obtained.  INTERPRETATION: Comparison is to a prior study dated 3/10/2016.  There is diffuse soft tissue edema identified about the knee. Small knee joint effusion. Mild prepatellar and infrapatellar soft tissue edema. There is intramuscular edema involving the distal biceps femoris muscle belly. Similar involvement of the lateral head of the gastrocnemius. Findings likely related to recent low grade strain. No discrete tendon tear or hematoma. Trace fluid in the popliteal bursa.  Bones: There is very subtle marrow edema identified adjacent to the lateral femoral epicondyle, nonspecific. This  may represent subtle reactive change. This is unchanged in the interval. No fracture or stress fracture is identified. No sign of osteonecrosis.  Ligaments and tendons: Likely prior, healed sprain of the medial collateral ligament proximally. No acute injury. No tear of the cruciate ligaments is identified. The iliotibial band remains intact.  Posterolateral corner: The fibular collateral ligament and remaining posterolateral corner structures are intact.  Extensor Mechanism: The patellar and quadriceps tendons are intact. The medial and lateral patellofemoral ligaments are intact.  Medial Compartment: The articular cartilage is intact, without focal defect or subchondral edema. There are postoperative changes of a prior partial medial meniscectomy. Diminutive size of the remaining body and posterior horn is noted. Portions have been resected in the interval. There is truncation along the free edge of the posterior horn, favored to be postoperative. There is concern for subtle horizontal, undersurface fraying of the posterior horn as seen on series 4 image 7-9. No displaced fragment is appreciated.  Lateral Compartment: Mild chondromalacia along the posterior articular surface of the lateral tibial plateau. No discrete tear of the lateral meniscus.  Patellofemoral articulation: Trochlear articular cartilage remains intact. No chondral defect of the patella.  CONCLUSION:  1. Postoperative changes of a prior partial right knee medial meniscectomy, with a diminutive size of the remaining body and posterior horn. Portions of meniscal tissue have been resected in the interval. Truncation along the free edge of the posterior horn is favored to be postoperative. There is concern for subtle, horizontal undersurface fraying of the posterior horn near the junction with the remaining posterior body. No displaced fragment.  2. Mild chondromalacia along the posterior articular surface of the lateral tibial plateau.  3. Small  "knee joint effusion.  4. Likely strain of the distal biceps femoris muscle belly, and of the lateral head of the gastrocnemius. No tendon tear or hematoma identified.  5. Subtle marrow edema identified adjacent to the lateral femoral epicondyle, nonspecific, but possibly representing subtle reactive change. This is unchanged in the interval. No fracture or stress fracture.  SRE    ASSESSMENT/PLAN    ICD-10-CM    1. Chronic pain of right knee M25.561 XR Leg Length Evaluation    G89.29 [facility]CT[facility] REFERRAL   2. Chronic low back pain without sciatica, unspecified back pain laterality M54.5 XR Leg Length Evaluation    G89.29 [facility] REFERRAL   3. Leg length discrepancy M21.70 [facility]RACT[facility] REFERRAL       Long-standing chronic knee pain with a variety of postural and biomechanical discrepancies which now appear to possibly be related to leg length discrepancy.  She finds that in standing straight her right knee slightly bent in which he forces that right knee straight her left leg comes off the ground.  Has occasional low back pain but no hip pain.  Does have a history of a lumbar surgery.  Has had a meniscectomy of the right knee but not the left.  No known ankle injuries.  Has a lifelong history of being told her hips are \"out of alignment\".  Measurements today reveal ASIS to medial malleolus distance of 96 cm equal bilaterally.  No obvious biomechanical discrepancies on bilateral joint exam today.  No obvious scoliosis noted.  Options discussed.  Will refer for a leg length discrepancy x-ray panel from hips to ankles throughout the hospital.  In the interim she will try a heel lift that she has already ordered.  Recheck after wearing heel lift for 2 weeks to assess results.  Meanwhile she will return to see Magnus Austin for further biomechanical work.    Time spent in one-on-one evalution and discussion with an established patient of this clinic.  regarding " nature of problem, course, prior treatments, and therapeutic options, at least 50% of which was spent in counseling and coordination of care: 25 minutes.

## 2018-07-06 NOTE — LETTER
7/6/2018         RE: Claribel Das  [address] [place] [address]        Dear Colleague,    Thank you for referring your patient, Claribel Das, to the [facility]. Please see a copy of my visit note below.      Musculoskeletal Problem         [facility]   Follow-up Visit s Jul 6, 2018    PCP: No Ref-Primary, Physician      Subjective:  Claribel is a 38 year old female who is seen in follow up for evaluation of   Chief Complaint   Patient presents with     Left Hip - Pain     Right Hip - Pain     Right Knee - RECHECK     Her last visit was on 4/20/17.  Since that time, symptoms have been about the same in her right knee. Today she would like to discuss her hips. Claribel Das is accompanied today by self.     Primarily has continued RIGHT knee pain and stiffness.    She is concerned of her bilateral hips and the alignment. She also has concerns of leg length discrepancy and overall alignment of the lower extremities.     Patient has noticed a stable course of symptoms with home exercise program and chiropractor treatment.  Patient has continued slight swelling in right knee. Also complains of knee stiffness and fatigue with physical activity.     Difficulty fully straightening RIGHT leg when standing up, wonders if LLD on LEFT shorter. Some back stiffness; hx of lumbar surgery.    Patient is using no aids.      Patient's past medical, surgical, social and family histories are reviewed today.    Patient Active Problem List    Diagnosis Date Noted     Somatic dysfunction of pelvis region 10/30/2017     Priority: Medium     Myalgia 10/30/2017     Priority: Medium     Right knee pain 09/15/2016     Priority: Medium     S/P meniscectomy 09/15/2016     Priority: Medium     Effusion of right knee 09/15/2016     Priority: Medium     Low back pain 12/04/2014     Priority: Medium     Diagnosis updated by automated process.  "Provider to review and confirm.       Lumbar radiculopathy 2014     Priority: Medium       Family History   Problem Relation Age of Onset     Cerebrovascular Disease Maternal Grandmother      Cerebrovascular Disease Maternal Grandfather      Cerebrovascular Disease Paternal Grandmother      Alcohol/Drug Brother      Osteoperosis Maternal Grandmother      Psychotic Disorder Brother        History     Social History     Marital Status:      Spouse Name: N/A     Number of Children: N/A     Years of Education: N/A     Social History Main Topics     Smoking status: Never Smoker      Smokeless tobacco: None     Alcohol Use: None     Drug Use: None     Sexually Active: None     Past Surgical History:   Procedure Laterality Date     BACK SURGERY  2012    lumbar microdiscectomy      SECTION  2010     HC KNEE SCOPE,SINGLE MENISECTOMY Right 1997       Review of Systems   Musculoskeletal: Positive for joint pain.   All other systems reviewed and are negative.        Physical Exam   Musculoskeletal:        Right knee: Lateral joint line tenderness noted.     /87  Ht 5' 10.5\" (1.791 m)  Wt 135 lb (61.2 kg)  BMI 19.1 kg/m2  Constitutional:well-developed, well-nourished, and in no distress.   Cardiovascular: Intact distal pulses.    Neurological: alert. Gait Normal:   Gait, station, stance, and balance appear normal for age  Skin: Skin is warm and dry.   Psychiatric: Mood and affect normal.   Respiratory: unlabored, speaks in full sentences  Lymph: no LAD, no lymphangitis      Right Knee Exam   Swelling: Mild  Effusion: Yes    Tenderness   The patient is experiencing tenderness in the lateral joint line.    Range of Motion   Extension: Normal  Flexion:     Abnormal    Tests   McMurrays:  Medial - Negative      Lateral - Negative  Lachman:  Anterior - Negative    Posterior - Negative  Drawer:       Anterior - Negative    Posterior - Negative  Varus:  Negative  Valgus: Negative  Pivot Shift: " Negative  Patellar Apprehension: Yes    Comments:  Pain with full flexion    Left Hip Exam   Gait: Normal.    Tenderness   None    Range of Motion   Extension:            Normal  Flexion:                 Normal  Internal Rotation:  30  External Rotation: 60  Abduction:            Normal  Adduction:            Normal    Muscle Strength   Abduction:  5/5  Adduction:  5/5  Flexion:      5/5    Tests   Ashwin: Negative  Charles:  N/t    Right Hip Exam   Gait: Normal.    Tenderness   None    Range of Motion   Extension:            Normal  Flexion:                 Normal  Internal Rotation:  30  External Rotation: 60  Abduction:            Normal  Adduction:            Normal    Muscle Strength   Abduction:  5/5  Adduction:  5/5  Flexion:      5/5    Tests   Ashwin: Negative  Charles:  N/t    Back Exam   Sensation: Normal.  Gait: Normal.    Tenderness   None    Range of Motion   Flexion:                    60  Extension:                Normal  Lateral Bend Left:    Normal  Lateral Bend Right:  Normal  Rotation Right:         Normal  Rotation Left:           Normal  SLR    Right: Negative  Left:    Negative    Muscle Strength   Normal back strength    Comments:  RIGHT hip slightly higher than LEFT   No external scoliosis noted          EXAM: MRI EXAMINATION OF THE RIGHT KNEE  CLINICAL INFORMATION: Bilateral knee pain. Pain in the right knee for approximately 5 months. Partial medial meniscectomy 7/28/2016. Prior partial meniscectomy in 1997.  TECHNICAL INFORMATION: MRI of the knee was performed without contrast. Proton density weighted images were obtained in all 3 planes. Additional T2 weighted coronal images were obtained.  INTERPRETATION: Comparison is to a prior study dated 3/10/2016.  There is diffuse soft tissue edema identified about the knee. Small knee joint effusion. Mild prepatellar and infrapatellar soft tissue edema. There is intramuscular edema involving the distal biceps femoris muscle belly. Similar involvement of  the lateral head of the gastrocnemius. Findings likely related to recent low grade strain. No discrete tendon tear or hematoma. Trace fluid in the popliteal bursa.  Bones: There is very subtle marrow edema identified adjacent to the lateral femoral epicondyle, nonspecific. This may represent subtle reactive change. This is unchanged in the interval. No fracture or stress fracture is identified. No sign of osteonecrosis.  Ligaments and tendons: Likely prior, healed sprain of the medial collateral ligament proximally. No acute injury. No tear of the cruciate ligaments is identified. The iliotibial band remains intact.  Posterolateral corner: The fibular collateral ligament and remaining posterolateral corner structures are intact.  Extensor Mechanism: The patellar and quadriceps tendons are intact. The medial and lateral patellofemoral ligaments are intact.  Medial Compartment: The articular cartilage is intact, without focal defect or subchondral edema. There are postoperative changes of a prior partial medial meniscectomy. Diminutive size of the remaining body and posterior horn is noted. Portions have been resected in the interval. There is truncation along the free edge of the posterior horn, favored to be postoperative. There is concern for subtle horizontal, undersurface fraying of the posterior horn as seen on series 4 image 7-9. No displaced fragment is appreciated.  Lateral Compartment: Mild chondromalacia along the posterior articular surface of the lateral tibial plateau. No discrete tear of the lateral meniscus.  Patellofemoral articulation: Trochlear articular cartilage remains intact. No chondral defect of the patella.  CONCLUSION:  1. Postoperative changes of a prior partial right knee medial meniscectomy, with a diminutive size of the remaining body and posterior horn. Portions of meniscal tissue have been resected in the interval. Truncation along the free edge of the posterior horn is favored to be  "postoperative. There is concern for subtle, horizontal undersurface fraying of the posterior horn near the junction with the remaining posterior body. No displaced fragment.  2. Mild chondromalacia along the posterior articular surface of the lateral tibial plateau.  3. Small knee joint effusion.  4. Likely strain of the distal biceps femoris muscle belly, and of the lateral head of the gastrocnemius. No tendon tear or hematoma identified.  5. Subtle marrow edema identified adjacent to the lateral femoral epicondyle, nonspecific, but possibly representing subtle reactive change. This is unchanged in the interval. No fracture or stress fracture.  SRE    ASSESSMENT/PLAN    ICD-10-CM    1. Chronic pain of right knee M25.561 XR Leg Length Evaluation    G89.29 [facility]   2. Chronic low back pain without sciatica, unspecified back pain laterality M54.5 XR Leg Length Evaluation    G89.29 [facility]   3. Leg length discrepancy M21.70 [facility]       Long-standing chronic knee pain with a variety of postural and biomechanical discrepancies which now appear to possibly be related to leg length discrepancy.  She finds that in standing straight her right knee slightly bent in which he forces that right knee straight her left leg comes off the ground.  Has occasional low back pain but no hip pain.  Does have a history of a lumbar surgery.  Has had a meniscectomy of the right knee but not the left.  No known ankle injuries.  Has a lifelong history of being told her hips are \"out of alignment\".  Measurements today reveal ASIS to medial malleolus distance of 96 cm equal bilaterally.  No obvious biomechanical discrepancies on bilateral joint exam today.  No obvious scoliosis noted.  Options discussed.  Will refer for a leg length discrepancy x-ray panel from hips to ankles throughout the hospital.  In the interim she will try a heel lift that " she has already ordered.  Recheck after wearing heel lift for 2 weeks to assess results.  Meanwhile she will return to see Magnus Austin for further biomechanical work.    Time spent in one-on-one evalution and discussion with an established patient of this clinic.  regarding nature of problem, course, prior treatments, and therapeutic options, at least 50% of which was spent in counseling and coordination of care: 25 minutes.    Again, thank you for allowing me to participate in the care of your patient.        Sincerely,        Bo Trinh MD

## 2018-07-19 ENCOUNTER — TELEPHONE (OUTPATIENT)
Dept: ORTHOPEDICS | Facility: CLINIC | Age: 38
End: 2018-07-19

## 2018-07-19 ENCOUNTER — HOSPITAL ENCOUNTER (OUTPATIENT)
Dept: GENERAL RADIOLOGY | Facility: CLINIC | Age: 38
Discharge: HOME | End: 2018-07-19
Attending: FAMILY MEDICINE | Admitting: FAMILY MEDICINE
Payer: COMMERCIAL

## 2018-07-19 ENCOUNTER — THERAPY VISIT (OUTPATIENT)
Dept: CHIROPRACTIC MEDICINE | Facility: CLINIC | Age: 38
End: 2018-07-19
Payer: COMMERCIAL

## 2018-07-19 DIAGNOSIS — M54.50 CHRONIC LOW BACK PAIN WITHOUT SCIATICA, UNSPECIFIED BACK PAIN LATERALITY: ICD-10-CM

## 2018-07-19 DIAGNOSIS — M99.05 SOMATIC DYSFUNCTION OF PELVIS REGION: ICD-10-CM

## 2018-07-19 DIAGNOSIS — M79.10 MYALGIA: ICD-10-CM

## 2018-07-19 DIAGNOSIS — G89.29 CHRONIC LOW BACK PAIN WITHOUT SCIATICA, UNSPECIFIED BACK PAIN LATERALITY: ICD-10-CM

## 2018-07-19 DIAGNOSIS — G89.29 CHRONIC PAIN OF RIGHT KNEE: ICD-10-CM

## 2018-07-19 DIAGNOSIS — M25.561 CHRONIC PAIN OF RIGHT KNEE: ICD-10-CM

## 2018-07-19 PROCEDURE — 98940 CHIROPRACT MANJ 1-2 REGIONS: CPT | Mod: AT | Performed by: CHIROPRACTOR

## 2018-07-19 PROCEDURE — 99211 OFF/OP EST MAY X REQ PHY/QHP: CPT | Mod: 25 | Performed by: CHIROPRACTOR

## 2018-07-19 PROCEDURE — 77073 BONE LENGTH STUDIES: CPT

## 2018-07-19 PROCEDURE — 97110 THERAPEUTIC EXERCISES: CPT | Performed by: CHIROPRACTOR

## 2018-07-19 NOTE — TELEPHONE ENCOUNTER
"Received call from [facility]. She states patient is there now for a leg length xray. They are having difficulty getting her ankle joints in the imaging as patient is 5'10\". Wanting to know if Dr. Trinh is ok with this.     Per Dr. Trinh, have them do the best they can.     Phone call to Nicole: [phone]. She states the radiologist recommended another view from her thigh down and will try to get a better image. Informed of Dr. Trinh's recommendations.     MO Barnard RN    "

## 2018-07-23 NOTE — PROGRESS NOTES
[facility]      Subjective:  Claribel Das   38 year old   female    CC: chronic pain right knee, B hips, lower back  Medications reviewed: Dimitri   Visit: 1 (re-exam)  Goal: sand for 30 minutes without pain, sit for 30 minutes without hip pain  Location: general lower back, R medial knee, B hips R medial and lateral knee  JORJE: Surgery for right knee July 2016, previous back surgery, torn meniscus in left knee  Pain: varies but always 1/10, worse with activity   Previous History: Surgery L5/S1, Knee surgery scope on R    Comes in today doing OK. Had another check with Dr. Trinh. She just had leg length x-ray done and he thought might be good idea to come back in for care. She did have symptomatic relief and was pleased with that. She continues to work on active care. Notes B hip pain and right medial knee pain. She is still remaining active. Working long hours on feet make her most sore. She is open to dry needling now to see if that helps.     Objective:  Inspection:  No SDD  Scars on right knee look normal     Palpation:  No specific pain  Palpable soreness over R medial knee, General hips, lower back  Myofascitis 2/4 noted over LPS, B hip ER's, R adductors     ROM:  Lumbar flexion   90/90, tightness over lower back and B hamstrings   Lumbar extension  30/30, lower back discomfort   Right Hip IR  24/45  Left Hip IR  39/45  Right Hip ER  54/60  Left  hip ER  48/60  Knee flexion full with tightness and pain over anterior knee on right  Knee abduction limited 7 degrees on right with discomfort over medial knee  Knee extension full    MMT:  Left glute med 4/5 with no pain  Right glute med 4/5 with no pain  Left TFL 4/5 with no pain  Right TFL 4/5 with no pain  Left piriformis 5/5 with no pain  Right piriformis 5/5 with no pain    MET:  Right short leg    Squat:  Shift to right  Foot flare to right  Arm drop on right    Lunge:  Right knee genu valgum  Right knee cross over      NAL:  Restricted SI on right side    Ortho:  SLR (tightness B hamstring), kemps (mild lower back discomfort)    Assessment:  NAL with associated myofascitis and weakness  Lumbago: post surgery  Hip Pain B  Knee pain R: Post surgery     Plan:  Patient tolerated treatment well today  Treatment Time: 45 minutes  85384 manipulation 1-2 segments: MET, Hip LAD, (NO manual adjustments)  52122 Manual therapy: (ART, Graston, Strain Counter Strain, Fascial Manipulation, Cupping) performed over area of R adductors, R hamstring, R hip ER's, L hip IR's, B psoas   42804 therapeutic exercise (20 minutes): review of some current PT exercises, bottom leg clam rotation X30, child pose stretch  Review of glute med exercise (clams, side lying leg lifts, band walks), glute max (single leg bridge, squat), balance (runner pull)  Strapping: hip IR on R, hip ER left  Taping:  Next visit: 2-3 week  Dry needle: discussed today for next session.   Shock wave: 15/3 over medial knee (did not do)  Plan: 6 visits  over next 10 weeks    Magnus Austin DC, MEd, ATC

## 2018-09-11 ENCOUNTER — OFFICE VISIT (OUTPATIENT)
Dept: ORTHOPEDICS | Facility: CLINIC | Age: 38
End: 2018-09-11
Payer: COMMERCIAL

## 2018-09-11 VITALS
HEIGHT: 71 IN | DIASTOLIC BLOOD PRESSURE: 84 MMHG | WEIGHT: 135 LBS | BODY MASS INDEX: 18.9 KG/M2 | SYSTOLIC BLOOD PRESSURE: 145 MMHG

## 2018-09-11 DIAGNOSIS — G89.29 CHRONIC PAIN OF BOTH KNEES: ICD-10-CM

## 2018-09-11 DIAGNOSIS — M25.561 CHRONIC PAIN OF BOTH KNEES: ICD-10-CM

## 2018-09-11 DIAGNOSIS — G89.29 CHRONIC LOW BACK PAIN WITHOUT SCIATICA, UNSPECIFIED BACK PAIN LATERALITY: ICD-10-CM

## 2018-09-11 DIAGNOSIS — M54.50 CHRONIC LOW BACK PAIN WITHOUT SCIATICA, UNSPECIFIED BACK PAIN LATERALITY: ICD-10-CM

## 2018-09-11 DIAGNOSIS — M21.70 ACQUIRED LEG LENGTH DISCREPANCY: Primary | ICD-10-CM

## 2018-09-11 DIAGNOSIS — M25.562 CHRONIC PAIN OF BOTH KNEES: ICD-10-CM

## 2018-09-11 PROCEDURE — 99213 OFFICE O/P EST LOW 20 MIN: CPT | Performed by: FAMILY MEDICINE

## 2018-09-11 RX ORDER — SODIUM PHOSPHATE,MONO-DIBASIC 19G-7G/118
1 ENEMA (ML) RECTAL DAILY
COMMUNITY

## 2018-09-11 NOTE — MR AVS SNAPSHOT
After Visit Summary   9/11/2018    Claribel Das    MRN: [id number]           Patient Information     Date Of Birth          1980        Visit Information        Provider Department      9/11/2018 5:20 PM Bo Trinh MD [facility]        Today's Diagnoses     Acquired leg length discrepancy    -  1    Chronic pain of both knees        Chronic low back pain without sciatica, unspecified back pain laterality           Follow-ups after your visit        Additional Services     ORTHOTICS REFERRAL       **This referral order prints off in the [facility]  (Orthotics & Prosthetics) Central Scheduling Office**    The [facility] Central Scheduling Staff will contact the patient to schedule appointments.     Central Scheduling Contact Information: [phone] ([place])    Orthotics: Foot Orthotics for left shorter leg by 7mm    Please be aware that coverage of these services is subject to the terms and limitations of your health insurance plan.  Call member services at your health plan with any benefit or coverage questions.      Please bring the following to your appointment:    >>   Any x-rays, CTs or MRIs which have been performed.  Contact the facility where they were done to arrange for  prior to your scheduled appointment.    >>   List of current medications   >>   This referral request   >>   Any documents/labs given to you for this referral                  Your next 10 appointments already scheduled     Sep 20, 2018  1:45 PM CDT   [facility] Chi[facility] with Magnus Austin DC   [facility] [place] [facility] ([facility] [place])    [address] [place] [address]  [address]  [place] [address] [phone]            Sep 27, 2018  3:15 PM CDT   [facility] Chiropractor with Magnus Ausitn DC   [facility] [place] [facility] ([facility] [place])    [address] [place] [address]  "[facility]  [address]  [place] [address] [phone]              Who to contact     If you have questions or need follow up information about today's clinic visit or your schedule please contact [facility] directly at [phone].  Normal or non-critical lab and imaging results will be communicated to you by MyChart, letter or phone within 4 business days after the clinic has received the results. If you do not hear from us within 7 days, please contact the clinic through MyChart or phone. If you have a critical or abnormal lab result, we will notify you by phone as soon as possible.  Submit refill requests through [employer] or call your pharmacy and they will forward the refill request to us. Please allow 3 business days for your refill to be completed.          Additional Information About Your Visit        MyChart Information     [employer] lets you send messages to your doctor, view your test results, renew your prescriptions, schedule appointments and more. To sign up, go to [email or web address] . Click on \"Log in\" on the left side of the screen, which will take you to the Welcome page. Then click on \"Sign up Now\" on the right side of the page.     You will be asked to enter the access code listed below, as well as some personal information. Please follow the directions to create your username and password.     Your access code is: [email or web address]  Expires: 12/10/2018  4:57 PM     Your access code will  in 90 days. If you need help or a new code, please call your [facility] clinic or [phone].        Care EveryWhere ID     This is your Care EveryWhere ID. This could be used by other organizations to access your [facility] medical records  [id number]        Your Vitals Were     Height BMI (Body Mass Index)                5' 10.5\" (1.791 m) 19.1 kg/m2           Blood Pressure from Last 3 Encounters:   18 145/84   18 133/87   17 125/82    " Weight from Last 3 Encounters:   09/11/18 135 lb (61.2 kg)   07/06/18 135 lb (61.2 kg)   04/20/17 135 lb (61.2 kg)              We Performed the Following     ORTHOTICS REFERRAL        Primary Care Provider Fax #    Physician No Ref-Primary [phone]       No address on file        Equal Access to Services     BRIGIDO KENISHA : Hadii aad ku hadasho Soomaali, waaxda luqadaha, qaybta kaalmada adeegyada, anamika escamilla lidyafiorella camarillo rodolfoelpidio johnson . So [facility] [phone].    ATENCIÓN: Si habla español, tiene a garcia disposición servicios gratuitos de asistencia lingüística. Llame al [phone].    We comply with applicable federal civil rights laws and [place] laws. We do not discriminate on the basis of race, color, national origin, age, disability, sex, sexual orientation, or gender identity.            Thank you!     Thank you for choosing [facility]  for your care. Our goal is always to provide you with excellent care. Hearing back from our patients is one way we can continue to improve our services. Please take a few minutes to complete the written survey that you may receive in the mail after your visit with us. Thank you!             Your Updated Medication List - Protect others around you: Learn how to safely use, store and throw away your medicines at www.disposemymeds.org.          This list is accurate as of 9/11/18  5:21 PM.  Always use your most recent med list.                   Brand Name Dispense Instructions for use Diagnosis    glucosamine-chondroitin 500-400 MG Caps per capsule      Take 1 capsule by mouth daily        NAPROXEN PO           NEW MED

## 2018-09-11 NOTE — LETTER
9/11/2018         RE: Claribel Das  [address] [place] [address]        Dear Colleague,    Thank you for referring your patient, Claribel Das, to the [facility]. Please see a copy of my visit note below.      Musculoskeletal Problem         [facility]   Follow-up Visit s Sep 11, 2018    PCP: No Ref-Primary, Physician      Subjective:  Claribel is a 38 year old female who is seen in follow up for evaluation of   Chief Complaint   Patient presents with     Right Knee - RECHECK, Results     Her last visit was on 7/6/2018.  Since that time, symptoms have been somewhat better. Claribel Das is accompanied today by self.       Patient had leg length xrays since last visit.     Patient reports 10% improvement since last visit.  Patient has noticed improved symptoms with heel lift and chiropractor  treatment.      Pain is located left and right knee and low back, waxing and waning. Worse with standing.   Patient is using no aids.    Patient denies any new injuries.    Patient's past medical, surgical, social and family histories are reviewed today.    History from previous visit on 7/6/2018  Her last visit was on 4/20/17.  Since that time, symptoms have been about the same in her right knee. Today she would like to discuss her hips. Claribel Das is accompanied today by self.     Primarily has continued RIGHT knee pain and stiffness.    She is concerned of her bilateral hips and the alignment. She also has concerns of leg length discrepancy and overall alignment of the lower extremities.     Patient has noticed a stable course of symptoms with home exercise program and chiropractor treatment.  Patient has continued slight swelling in right knee. Also complains of knee stiffness and fatigue with physical activity.     Difficulty fully straightening RIGHT leg when standing up, wonders if LLD on LEFT shorter. Some back  "stiffness; hx of lumbar surgery.    Patient is using no aids.      Patient's past medical, surgical, social and family histories are reviewed today.    Patient Active Problem List    Diagnosis Date Noted     Somatic dysfunction of pelvis region 10/30/2017     Priority: Medium     Myalgia 10/30/2017     Priority: Medium     Right knee pain 09/15/2016     Priority: Medium     S/P meniscectomy 09/15/2016     Priority: Medium     Effusion of right knee 09/15/2016     Priority: Medium     Low back pain 2014     Priority: Medium     Diagnosis updated by automated process. Provider to review and confirm.       Lumbar radiculopathy 2014     Priority: Medium       Family History   Problem Relation Age of Onset     Cerebrovascular Disease Maternal Grandmother      Cerebrovascular Disease Maternal Grandfather      Cerebrovascular Disease Paternal Grandmother      Alcohol/Drug Brother      Osteoporosis Maternal Grandmother      Psychotic Disorder Brother        History     Social History     Marital Status:      Spouse Name: N/A     Number of Children: N/A     Years of Education: N/A     Social History Main Topics     Smoking status: Never Smoker      Smokeless tobacco: None     Alcohol Use: None     Drug Use: None     Sexually Active: None     Past Surgical History:   Procedure Laterality Date     BACK SURGERY  2012    lumbar microdiscectomy      SECTION  2010     HC KNEE SCOPE,SINGLE MENISECTOMY Right        Review of Systems   Musculoskeletal: Positive for joint pain.   All other systems reviewed and are negative.        Physical Exam   Musculoskeletal:        Right knee: Lateral joint line tenderness noted.     /84  Ht 5' 10.5\" (1.791 m)  Wt 135 lb (61.2 kg)  BMI 19.1 kg/m2  Constitutional:well-developed, well-nourished, and in no distress.   Cardiovascular: Intact distal pulses.    Neurological: alert. Gait Normal:   Gait, station, stance, and balance appear normal for age  Skin: " Skin is warm and dry.   Psychiatric: Mood and affect normal.   Respiratory: unlabored, speaks in full sentences  Lymph: no LAD, no lymphangitis      Right Knee Exam   Swelling: Mild  Effusion: Yes    Tenderness   The patient is experiencing tenderness in the lateral joint line.    Range of Motion   Extension: Normal  Flexion:     Abnormal    Tests   McMurrays:  Medial - Negative      Lateral - Negative  Lachman:  Anterior - Negative    Posterior - Negative  Drawer:       Anterior - Negative    Posterior - Negative  Varus:  Negative  Valgus: Negative  Pivot Shift: Negative  Patellar Apprehension: Yes    Comments:  Pain with full flexion    Left Hip Exam   Gait: Normal.    Tenderness   None    Range of Motion   Extension:            Normal  Flexion:                 Normal  Internal Rotation:  30  External Rotation: 60  Abduction:            Normal  Adduction:            Normal    Muscle Strength   Abduction:  5/5  Adduction:  5/5  Flexion:      5/5    Tests   Ashwin: Negative  Charles:  N/t    Right Hip Exam   Gait: Normal.    Tenderness   None    Range of Motion   Extension:            Normal  Flexion:                 Normal  Internal Rotation:  30  External Rotation: 60  Abduction:            Normal  Adduction:            Normal    Muscle Strength   Abduction:  5/5  Adduction:  5/5  Flexion:      5/5    Tests   Ashwin: Negative  Charles:  N/t      LEG LENGTH EVALUATION 7/19/2018 1:16 PM      HISTORY: Chronic knee pain, uneven posture, right leg clinically  longer than left, evaluate for discrepancy. Chronic pain of right  knee. Chronic low back pain without sciatica, unspecified back pain  laterality.     FINDINGS: The right femur measured approximately 45 cm from the top of  the femoral head to the inferior margin of the medial femoral condyle,  and the left femur 44.7 cm. The right leg measured 42.3 cm to the  medial notch of the tibial plafond and the left leg 41.8 cm. From the  top of the femoral heads to the medial  notch of the tibial plafonds,  the right lower extremity measured 87.3 cm in length and the left  lower extremity 86.5 cm in length. Bilateral hip, bilateral knee, and  bilateral ankle joint space widths appear within normal limits.         IMPRESSION: Right lower extremity measures less than 1 cm longer than  the left.     ARTIS AGOSTO MD      ASSESSMENT/PLAN    ICD-10-CM    1. Acquired leg length discrepancy M21.70 ORTHOTICS REFERRAL   2. Chronic pain of both knees M25.561 ORTHOTICS REFERRAL    M25.562     G89.29    3. Chronic low back pain without sciatica, unspecified back pain laterality M54.5 ORTHOTICS REFERRAL    G89.29        Claribel has experienced a number of difficulties involving her lower extremities and with confirmed though small leg length discrepancy leaving left leg slightly shorter, it is possible that custom orthotics may help compensate for that and relieve some of her symptoms.  She has been through extensive physical therapy as well.  Discussed options and she is interested in giving orthotics a try, referral placed to [facility]    Again, thank you for allowing me to participate in the care of your patient.        Sincerely,        Bo Trinh MD

## 2018-09-11 NOTE — PROGRESS NOTES
Musculoskeletal Problem         [facility]   Follow-up Visit s Sep 11, 2018    PCP: Kaylee Ref-Primary, Physician      Subjective:  Claribel is a 38 year old female who is seen in follow up for evaluation of   Chief Complaint   Patient presents with     Right Knee - RECHECK, Results     Her last visit was on 7/6/2018.  Since that time, symptoms have been somewhat better. Claribel Das is accompanied today by self.       Patient had leg length xrays since last visit.     Patient reports 10% improvement since last visit.  Patient has noticed improved symptoms with heel lift and chiropractor  treatment.      Pain is located left and right knee and low back, waxing and waning. Worse with standing.   Patient is using no aids.    Patient denies any new injuries.    Patient's past medical, surgical, social and family histories are reviewed today.    History from previous visit on 7/6/2018  Her last visit was on 4/20/17.  Since that time, symptoms have been about the same in her right knee. Today she would like to discuss her hips. Claribel Das is accompanied today by self.     Primarily has continued RIGHT knee pain and stiffness.    She is concerned of her bilateral hips and the alignment. She also has concerns of leg length discrepancy and overall alignment of the lower extremities.     Patient has noticed a stable course of symptoms with home exercise program and chiropractor treatment.  Patient has continued slight swelling in right knee. Also complains of knee stiffness and fatigue with physical activity.     Difficulty fully straightening RIGHT leg when standing up, wonders if LLD on LEFT shorter. Some back stiffness; hx of lumbar surgery.    Patient is using no aids.      Patient's past medical, surgical, social and family histories are reviewed today.    Patient Active Problem List    Diagnosis Date Noted     Somatic dysfunction of pelvis region 10/30/2017     Priority: Medium  "    Myalgia 10/30/2017     Priority: Medium     Right knee pain 09/15/2016     Priority: Medium     S/P meniscectomy 09/15/2016     Priority: Medium     Effusion of right knee 09/15/2016     Priority: Medium     Low back pain 2014     Priority: Medium     Diagnosis updated by automated process. Provider to review and confirm.       Lumbar radiculopathy 2014     Priority: Medium       Family History   Problem Relation Age of Onset     Cerebrovascular Disease Maternal Grandmother      Cerebrovascular Disease Maternal Grandfather      Cerebrovascular Disease Paternal Grandmother      Alcohol/Drug Brother      Osteoporosis Maternal Grandmother      Psychotic Disorder Brother        History     Social History     Marital Status:      Spouse Name: N/A     Number of Children: N/A     Years of Education: N/A     Social History Main Topics     Smoking status: Never Smoker      Smokeless tobacco: None     Alcohol Use: None     Drug Use: None     Sexually Active: None     Past Surgical History:   Procedure Laterality Date     BACK SURGERY      lumbar microdiscectomy      SECTION       HC KNEE SCOPE,SINGLE MENISECTOMY Right        Review of Systems   Musculoskeletal: Positive for joint pain.   All other systems reviewed and are negative.        Physical Exam   Musculoskeletal:        Right knee: Lateral joint line tenderness noted.     /84  Ht 5' 10.5\" (1.791 m)  Wt 135 lb (61.2 kg)  BMI 19.1 kg/m2  Constitutional:well-developed, well-nourished, and in no distress.   Cardiovascular: Intact distal pulses.    Neurological: alert. Gait Normal:   Gait, station, stance, and balance appear normal for age  Skin: Skin is warm and dry.   Psychiatric: Mood and affect normal.   Respiratory: unlabored, speaks in full sentences  Lymph: no LAD, no lymphangitis      Right Knee Exam   Swelling: Mild  Effusion: Yes    Tenderness   The patient is experiencing tenderness in the lateral joint " line.    Range of Motion   Extension: Normal  Flexion:     Abnormal    Tests   McMurrays:  Medial - Negative      Lateral - Negative  Lachman:  Anterior - Negative    Posterior - Negative  Drawer:       Anterior - Negative    Posterior - Negative  Varus:  Negative  Valgus: Negative  Pivot Shift: Negative  Patellar Apprehension: Yes    Comments:  Pain with full flexion    Left Hip Exam   Gait: Normal.    Tenderness   None    Range of Motion   Extension:            Normal  Flexion:                 Normal  Internal Rotation:  30  External Rotation: 60  Abduction:            Normal  Adduction:            Normal    Muscle Strength   Abduction:  5/5  Adduction:  5/5  Flexion:      5/5    Tests   Ashwin: Negative  Charles:  N/t    Right Hip Exam   Gait: Normal.    Tenderness   None    Range of Motion   Extension:            Normal  Flexion:                 Normal  Internal Rotation:  30  External Rotation: 60  Abduction:            Normal  Adduction:            Normal    Muscle Strength   Abduction:  5/5  Adduction:  5/5  Flexion:      5/5    Tests   Ashwin: Negative  Charles:  N/t      LEG LENGTH EVALUATION 7/19/2018 1:16 PM      HISTORY: Chronic knee pain, uneven posture, right leg clinically  longer than left, evaluate for discrepancy. Chronic pain of right  knee. Chronic low back pain without sciatica, unspecified back pain  laterality.     FINDINGS: The right femur measured approximately 45 cm from the top of  the femoral head to the inferior margin of the medial femoral condyle,  and the left femur 44.7 cm. The right leg measured 42.3 cm to the  medial notch of the tibial plafond and the left leg 41.8 cm. From the  top of the femoral heads to the medial notch of the tibial plafonds,  the right lower extremity measured 87.3 cm in length and the left  lower extremity 86.5 cm in length. Bilateral hip, bilateral knee, and  bilateral ankle joint space widths appear within normal limits.         IMPRESSION: Right lower extremity  measures less than 1 cm longer than  the left.     ARTIS AGOSTO MD      ASSESSMENT/PLAN    ICD-10-CM    1. Acquired leg length discrepancy M21.70 ORTHOTICS REFERRAL   2. Chronic pain of both knees M25.561 ORTHOTICS REFERRAL    M25.562     G89.29    3. Chronic low back pain without sciatica, unspecified back pain laterality M54.5 ORTHOTICS REFERRAL    G89.29        Claribel has experienced a number of difficulties involving her lower extremities and with confirmed though small leg length discrepancy leaving left leg slightly shorter, it is possible that custom orthotics may help compensate for that and relieve some of her symptoms.  She has been through extensive physical therapy as well.  Discussed options and she is interested in giving orthotics a try, referral [facility]

## 2018-09-20 ENCOUNTER — THERAPY VISIT (OUTPATIENT)
Dept: CHIROPRACTIC MEDICINE | Facility: CLINIC | Age: 38
End: 2018-09-20
Payer: COMMERCIAL

## 2018-09-20 DIAGNOSIS — G89.29 CHRONIC LOW BACK PAIN WITHOUT SCIATICA, UNSPECIFIED BACK PAIN LATERALITY: ICD-10-CM

## 2018-09-20 DIAGNOSIS — M99.05 SOMATIC DYSFUNCTION OF PELVIS REGION: ICD-10-CM

## 2018-09-20 DIAGNOSIS — M54.50 CHRONIC LOW BACK PAIN WITHOUT SCIATICA, UNSPECIFIED BACK PAIN LATERALITY: ICD-10-CM

## 2018-09-20 DIAGNOSIS — M79.10 MYALGIA: ICD-10-CM

## 2018-09-20 DIAGNOSIS — M25.561 CHRONIC PAIN OF RIGHT KNEE: ICD-10-CM

## 2018-09-20 DIAGNOSIS — G89.29 CHRONIC PAIN OF RIGHT KNEE: ICD-10-CM

## 2018-09-20 PROCEDURE — 97110 THERAPEUTIC EXERCISES: CPT | Performed by: CHIROPRACTOR

## 2018-09-20 PROCEDURE — 98940 CHIROPRACT MANJ 1-2 REGIONS: CPT | Mod: AT | Performed by: CHIROPRACTOR

## 2018-09-21 NOTE — PROGRESS NOTES
[facility]      Subjective:  Claribel Das   38 year old   female    CC: chronic pain right knee, B hips, lower back  Medications reviewed: Denies   Visit: 2  Goal: sand for 30 minutes without pain, sit for 30 minutes without hip pain  Location: general lower back, R medial knee, B hips R medial and lateral knee  JORJE: Surgery for right knee July 2016, previous back surgery, torn meniscus in left knee  Pain: varies but always 1/10, worse with activity   Previous History: Surgery L5/S1, Knee surgery scope on R    Comes in today doing OK. Notes going to get custom orthotics as had leg length measurement done and is about 7 MM off (longer on right) and that is what MD recommended. She notes her knees has made some mild improvement and back is doing OK. She denies any new issues. Notes she responded well to last session. Denies any new changes in health history.     Objective:  Inspection:  No SDD  Scars on right knee look normal     Palpation:  No specific pain  Palpable soreness over R medial knee, General hips, lower back  Myofascitis 2/4 noted over LPS, B hip ER's, R adductors     ROM:  Lumbar flexion   90/90, tightness over lower back and B hamstrings   Lumbar extension  30/30, lower back discomfort   Right Hip IR  24/45  Left Hip IR  39/45  Right Hip ER  54/60  Left  hip ER  48/60  Knee flexion full with tightness and pain over anterior knee on right  Knee abduction limited 7 degrees on right with discomfort over medial knee  Knee extension full    MMT:  Left glute med 4/5 with no pain  Right glute med 4/5 with no pain  Left TFL 4/5 with no pain  Right TFL 4/5 with no pain  Left piriformis 5/5 with no pain  Right piriformis 5/5 with no pain    MET:  Right short leg    Squat:  Shift to right  Foot flare to right  Arm drop on right    Lunge:  Right knee genu valgum  Right knee cross over     NAL:  Restricted SI on right side    Ortho:  SLR (tightness B hamstring), kemps (mild lower back  discomfort)    Assessment:  NAL with associated myofascitis and weakness  Lumbago: post surgery  Hip Pain B  Knee pain R: Post surgery     Plan:  Patient tolerated treatment well today  Treatment Time: 45 minutes  72720 manipulation 1-2 segments: MET, Hip LAD, (NO manual adjustments)  30791 Manual therapy: (ART, Graston, Strain Counter Strain, Fascial Manipulation, Cupping) performed over area of R adductors, R hamstring, R hip ER's, L hip IR's, B psoas   96169 therapeutic exercise (20 minutes): review of some current PT exercises, bottom leg clam rotation X30, child pose stretch  Review of glute med exercise (clams, side lying leg lifts, band walks), glute max (single leg bridge, squat), balance (runner pull), self MET orrection   Strapping: hip IR on R, hip ER left  Taping:  Next visit: 2-3 week  Dry needle: discussed today for next session.   Shock wave: 15/3 over medial knee (did not do)  Plan: 6 visits  over next 10 weeks    Magnus Austin DC, MEd, ATC

## 2022-05-12 ENCOUNTER — LAB REQUISITION (OUTPATIENT)
Dept: LAB | Facility: CLINIC | Age: 42
End: 2022-05-12
Payer: COMMERCIAL

## 2022-05-12 PROCEDURE — 88305 TISSUE EXAM BY PATHOLOGIST: CPT | Mod: TC,ORL | Performed by: OBSTETRICS & GYNECOLOGY

## 2022-05-15 LAB
PATH REPORT.COMMENTS IMP SPEC: NORMAL
PATH REPORT.COMMENTS IMP SPEC: NORMAL
PATH REPORT.FINAL DX SPEC: NORMAL
PATH REPORT.GROSS SPEC: NORMAL
PATH REPORT.MICROSCOPIC SPEC OTHER STN: NORMAL
PATH REPORT.RELEVANT HX SPEC: NORMAL
PHOTO IMAGE: NORMAL

## 2022-05-15 PROCEDURE — 88305 TISSUE EXAM BY PATHOLOGIST: CPT | Mod: 26 | Performed by: PATHOLOGY

## 2024-12-19 ENCOUNTER — LAB REQUISITION (OUTPATIENT)
Dept: LAB | Facility: CLINIC | Age: 44
End: 2024-12-19

## 2024-12-19 DIAGNOSIS — L65.9 NONSCARRING HAIR LOSS, UNSPECIFIED: ICD-10-CM

## 2024-12-19 LAB
ERYTHROCYTE [DISTWIDTH] IN BLOOD BY AUTOMATED COUNT: 12 % (ref 10–15)
HCT VFR BLD AUTO: 43.3 % (ref 35–47)
HGB BLD-MCNC: 14.5 G/DL (ref 11.7–15.7)
MCH RBC QN AUTO: 31.8 PG (ref 26.5–33)
MCHC RBC AUTO-ENTMCNC: 33.5 G/DL (ref 31.5–36.5)
MCV RBC AUTO: 95 FL (ref 78–100)
PLATELET # BLD AUTO: 296 10E3/UL (ref 150–450)
RBC # BLD AUTO: 4.56 10E6/UL (ref 3.8–5.2)
WBC # BLD AUTO: 5.6 10E3/UL (ref 4–11)

## 2024-12-19 PROCEDURE — 85027 COMPLETE CBC AUTOMATED: CPT | Performed by: PHYSICIAN ASSISTANT
